# Patient Record
Sex: FEMALE | HISPANIC OR LATINO | Employment: FULL TIME | URBAN - METROPOLITAN AREA
[De-identification: names, ages, dates, MRNs, and addresses within clinical notes are randomized per-mention and may not be internally consistent; named-entity substitution may affect disease eponyms.]

---

## 2021-12-23 ENCOUNTER — HOSPITAL ENCOUNTER (EMERGENCY)
Facility: CLINIC | Age: 41
Discharge: HOME | End: 2021-12-23
Attending: EMERGENCY MEDICINE | Admitting: EMERGENCY MEDICINE
Payer: MEDICAID

## 2021-12-23 ENCOUNTER — APPOINTMENT (OUTPATIENT)
Dept: ULTRASOUND IMAGING | Facility: CLINIC | Age: 41
End: 2021-12-23
Attending: EMERGENCY MEDICINE
Payer: MEDICAID

## 2021-12-23 VITALS
SYSTOLIC BLOOD PRESSURE: 121 MMHG | DIASTOLIC BLOOD PRESSURE: 76 MMHG | OXYGEN SATURATION: 98 % | HEART RATE: 80 BPM | RESPIRATION RATE: 16 BRPM

## 2021-12-23 DIAGNOSIS — Z3A.10 10 WEEKS GESTATION OF PREGNANCY: ICD-10-CM

## 2021-12-23 LAB
ALBUMIN UR-MCNC: 10 MG/DL
APPEARANCE UR: CLEAR
BACTERIA #/AREA URNS HPF: ABNORMAL /HPF
BILIRUB UR QL STRIP: NEGATIVE
COLOR UR AUTO: ABNORMAL
GLUCOSE UR STRIP-MCNC: NEGATIVE MG/DL
HCG UR QL: POSITIVE
HGB UR QL STRIP: NEGATIVE
KETONES UR STRIP-MCNC: NEGATIVE MG/DL
LEUKOCYTE ESTERASE UR QL STRIP: NEGATIVE
MUCOUS THREADS #/AREA URNS LPF: PRESENT /LPF
NITRATE UR QL: NEGATIVE
PH UR STRIP: 6.5 [PH] (ref 5–7)
RBC URINE: 1 /HPF
SP GR UR STRIP: 1.02 (ref 1–1.03)
SQUAMOUS EPITHELIAL: 11 /HPF
UROBILINOGEN UR STRIP-MCNC: NORMAL MG/DL
WBC URINE: 1 /HPF

## 2021-12-23 PROCEDURE — 76815 OB US LIMITED FETUS(S): CPT

## 2021-12-23 PROCEDURE — 76801 OB US < 14 WKS SINGLE FETUS: CPT

## 2021-12-23 PROCEDURE — 81025 URINE PREGNANCY TEST: CPT | Performed by: EMERGENCY MEDICINE

## 2021-12-23 PROCEDURE — 81001 URINALYSIS AUTO W/SCOPE: CPT | Performed by: EMERGENCY MEDICINE

## 2021-12-23 PROCEDURE — 99285 EMERGENCY DEPT VISIT HI MDM: CPT | Mod: 25

## 2021-12-23 ASSESSMENT — ENCOUNTER SYMPTOMS: ABDOMINAL PAIN: 0

## 2021-12-23 NOTE — ED TRIAGE NOTES
Patient reports she is 2 months late for her menstrual cycle. She has not taken a home pregnancy test. She was told previously that she is unable to sustain any further pregnancies. Patient Thai speaking only.

## 2021-12-23 NOTE — ED PROVIDER NOTES
History   Chief Complaint:  Pregnancy Test     The history is provided by the patient. A  was used (South African).      Anh Roberts is a 41 year old female who presents with possible pregnancy. The patient states she is 2 months late for her menstrual cycle. Her last menstrual cycle was in October. She notes at [facility] 2 years ago, she was told she had a uterine fibroid and would not be able to have another pregnancy in the future. She denies any abdominal pain or vaginal bleeding. The patient states she has not taken any home pregnancy tests.     Review of Systems   Gastrointestinal: Negative for abdominal pain.   Genitourinary: Negative for vaginal bleeding.   All other systems reviewed and are negative.    Allergies:  Aspirin      Medications:  Singulair   Flonase     Past Medical History:     Uterine fibroid     Family History:  Mother - Diabetes Mellitus       Social History:  The patient was unaccompanied to the emergency department.    Physical Exam     Patient Vitals for the past 24 hrs:   BP Pulse Resp SpO2   12/23/21 0900 121/76 80 16 98 %     Physical Exam  General: Resting comfortably on the gurney  Head:  The scalp, face, and head appear normal  Eyes:  The pupils are equal, round, and reactive to light    There is no nystagmus    Extraocular muscles are intact    Conjunctivae and sclerae are normal  ENT:    The nose is normal    Pinnae are normal    The oropharynx is normal    Uvula is in the midline  Neck:  Normal range of motion    There is no rigidity noted    There is no midline cervical spine pain/tenderness    Trachea is in the midline    No mass is detected  CV:  Regular rate and underlying rhythm     Normal S1/S2, no S3/S4    No pathological murmur detected  Resp:  Lungs are clear    There is no tachypnea    Non-labored    No rales    No wheezing   GI:  There is a large, firm, suprapubic mass likely consistent with known fibroid and 1st trimester pregnancy.      No tympani    No rebound tenderness     Non-surgical without peritoneal features  MS:  Normal muscular tone    Symmetric motor strength    No major joint effusions    No asymmetric leg swelling, no calf tenderness  Skin:  No rash or acute skin lesions noted  Neuro:  Speech is normal and fluent  Psych: Awake. Alert.      Normal affect.  Appropriate interactions.  Lymph: No anterior cervical lymphadenopathy noted    Emergency Department Course   Imaging:  US OB < 14 Weeks Single   Preliminary Result   IMPRESSION:    1.  Single living intrauterine gestation at 10 weeks 2 days, EDC   7/19/2022.   2.  Large uterine fibroid in the uterine body on the right measures   14.2 cm.   3.  Nonvisualization of the right ovary.       POC US OB TRANSABDOMINAL LIMITED   Final Result   PROCEDURE: Limited Point of Care Bedside OB Ultrasound   PERFORMED BY: Dr. Clint Mejia   INDICATIONS/SYMPTOM:  Pelvic Pain, Low abdominal pain   PROBE: Low Frequency Convex probe, transabdominal    BODY LOCATION: The ultrasound was performed using a low frequency probe, transabdominal technique.  Longitudinal and transverse views.   FINDINGS:    1.  Gestational sac seen: Yes   2.  Intrauterine pregnancy seen: Yes   3.  Fetal cardiac activity: Yes, 160s   4.  Fetal movement seen: Yes   5.  Free fluid in the pelvis: No   Other findings: Very large uterine fibroid noted   INTERPRETATION: The bedside US exam reveals viable  intrauterine pregnancy at this time.  There is a large fibroid noted.  A formal ultrasound is also ordered   IMAGE DOCUMENTATION: Images were archived to the US hard drive, and archived to PACS system.      Report per radiology   POC US read by me.     Laboratory:  Labs Ordered and Resulted from Time of ED Arrival to Time of ED Departure   HCG QUALITATIVE URINE - Abnormal       Result Value    hCG Urine Qualitative Positive (*)    ROUTINE UA WITH MICROSCOPIC REFLEX TO CULTURE - Abnormal    Color Urine Light Yellow      Appearance  Urine Clear      Glucose Urine Negative      Bilirubin Urine Negative      Ketones Urine Negative      Specific Gravity Urine 1.024      Blood Urine Negative      pH Urine 6.5      Protein Albumin Urine 10  (*)     Urobilinogen Urine Normal      Nitrite Urine Negative      Leukocyte Esterase Urine Negative      Bacteria Urine Few (*)     Mucus Urine Present (*)     RBC Urine 1      WBC Urine 1      Squamous Epithelials Urine 11 (*)      Emergency Department Course:  Reviewed:  I reviewed nursing notes and vitals    Assessments:  0856 I obtained history and examined the patient as noted above.   0953 I performed a bedside ultrasound, findings above.   1056 I rechecked the patient and explained findings. The patient states she has two teenage children. This is an unexpected pregnancy.     Disposition:  The patient was discharged to home.     Impression & Plan   Medical Decision Making:  This patient presents with a known history of uterine fibroid who is under the believe, as she was apparently told by some healthcare provider, that she was unable to get pregnant given the size of the fibroid.  She comes in having missed 2 periods.  Patient's urine pregnancy test is positive.  Bedside ultrasound confirms intrauterine pregnancy in the first trimester.  A large uterine fibroid was noted on the right.  So as to establish a baseline formal ultrasound the patient was sent for formal ultrasonography which showed a very large fibroid in the uterus as noted above with a viable 10-week +2-day intrauterine pregnancy.  Patient was given this information and she is referred to OB/GYN for consultation follow-up and management.  No life-threatening etiologies are noted at this time.  There is no evidence of ectopic pregnancy or or ovarian torsion.    Diagnosis:    ICD-10-CM    1. 10 weeks gestation of pregnancy  Z3A.10      Scribe Disclosure:  Janet POWELL, am serving as a scribe at 8:56 AM on 12/23/2021 to document services  personally performed by Clint Mejia MD based on my observations and the provider's statements to me.     Clint Mejia MD  12/23/21 1155

## 2022-01-05 ENCOUNTER — APPOINTMENT (OUTPATIENT)
Dept: ULTRASOUND IMAGING | Facility: CLINIC | Age: 42
End: 2022-01-05
Attending: EMERGENCY MEDICINE
Payer: MEDICAID

## 2022-01-05 ENCOUNTER — HOSPITAL ENCOUNTER (EMERGENCY)
Facility: CLINIC | Age: 42
Discharge: HOME | End: 2022-01-05
Attending: EMERGENCY MEDICINE | Admitting: EMERGENCY MEDICINE
Payer: MEDICAID

## 2022-01-05 VITALS
HEART RATE: 83 BPM | SYSTOLIC BLOOD PRESSURE: 106 MMHG | DIASTOLIC BLOOD PRESSURE: 71 MMHG | TEMPERATURE: 98.3 F | OXYGEN SATURATION: 100 % | RESPIRATION RATE: 18 BRPM

## 2022-01-05 DIAGNOSIS — O20.0 THREATENED MISCARRIAGE: Primary | ICD-10-CM

## 2022-01-05 DIAGNOSIS — O46.90 VAGINAL BLEEDING DURING PREGNANCY: ICD-10-CM

## 2022-01-05 LAB
ABO/RH(D): NORMAL
ANION GAP SERPL CALCULATED.3IONS-SCNC: 6 MMOL/L (ref 3–14)
ANTIBODY SCREEN: NEGATIVE
B-HCG SERPL-ACNC: ABNORMAL IU/L (ref 0–5)
BASOPHILS # BLD AUTO: 0 10E3/UL (ref 0–0.2)
BASOPHILS NFR BLD AUTO: 0 %
BUN SERPL-MCNC: 9 MG/DL (ref 7–30)
CALCIUM SERPL-MCNC: 8.4 MG/DL (ref 8.5–10.1)
CHLORIDE BLD-SCNC: 109 MMOL/L (ref 94–109)
CO2 SERPL-SCNC: 22 MMOL/L (ref 20–32)
CREAT SERPL-MCNC: 0.46 MG/DL (ref 0.52–1.04)
EOSINOPHIL # BLD AUTO: 0.1 10E3/UL (ref 0–0.7)
EOSINOPHIL NFR BLD AUTO: 1 %
ERYTHROCYTE [DISTWIDTH] IN BLOOD BY AUTOMATED COUNT: 19.4 % (ref 10–15)
GFR SERPL CREATININE-BSD FRML MDRD: >90 ML/MIN/1.73M2
GLUCOSE BLD-MCNC: 90 MG/DL (ref 70–99)
HCT VFR BLD AUTO: 31.9 % (ref 35–47)
HGB BLD-MCNC: 9.3 G/DL (ref 11.7–15.7)
IMM GRANULOCYTES # BLD: 0 10E3/UL
IMM GRANULOCYTES NFR BLD: 0 %
LYMPHOCYTES # BLD AUTO: 1.5 10E3/UL (ref 0.8–5.3)
LYMPHOCYTES NFR BLD AUTO: 18 %
MCH RBC QN AUTO: 22.9 PG (ref 26.5–33)
MCHC RBC AUTO-ENTMCNC: 29.2 G/DL (ref 31.5–36.5)
MCV RBC AUTO: 79 FL (ref 78–100)
MONOCYTES # BLD AUTO: 0.7 10E3/UL (ref 0–1.3)
MONOCYTES NFR BLD AUTO: 8 %
NEUTROPHILS # BLD AUTO: 6.3 10E3/UL (ref 1.6–8.3)
NEUTROPHILS NFR BLD AUTO: 73 %
NRBC # BLD AUTO: 0 10E3/UL
NRBC BLD AUTO-RTO: 0 /100
PLATELET # BLD AUTO: 389 10E3/UL (ref 150–450)
POTASSIUM BLD-SCNC: 3.6 MMOL/L (ref 3.4–5.3)
RBC # BLD AUTO: 4.06 10E6/UL (ref 3.8–5.2)
SODIUM SERPL-SCNC: 137 MMOL/L (ref 133–144)
SPECIMEN EXPIRATION DATE: NORMAL
WBC # BLD AUTO: 8.5 10E3/UL (ref 4–11)

## 2022-01-05 PROCEDURE — 36415 COLL VENOUS BLD VENIPUNCTURE: CPT | Performed by: EMERGENCY MEDICINE

## 2022-01-05 PROCEDURE — 85014 HEMATOCRIT: CPT | Performed by: EMERGENCY MEDICINE

## 2022-01-05 PROCEDURE — 76801 OB US < 14 WKS SINGLE FETUS: CPT

## 2022-01-05 PROCEDURE — 82310 ASSAY OF CALCIUM: CPT | Performed by: EMERGENCY MEDICINE

## 2022-01-05 PROCEDURE — 99284 EMERGENCY DEPT VISIT MOD MDM: CPT | Mod: 25

## 2022-01-05 PROCEDURE — 86850 RBC ANTIBODY SCREEN: CPT | Performed by: EMERGENCY MEDICINE

## 2022-01-05 PROCEDURE — 86901 BLOOD TYPING SEROLOGIC RH(D): CPT | Performed by: EMERGENCY MEDICINE

## 2022-01-05 PROCEDURE — 84702 CHORIONIC GONADOTROPIN TEST: CPT | Performed by: EMERGENCY MEDICINE

## 2022-01-05 ASSESSMENT — ENCOUNTER SYMPTOMS
FEVER: 0
ABDOMINAL PAIN: 0
SHORTNESS OF BREATH: 0

## 2022-01-05 NOTE — ED PROVIDER NOTES
History   Chief Complaint:  Vaginal Bleeding     The history is provided by the patient.      Anh Roberts is a 41 year old female, , who presents with vaginal bleeding. Her first two pregnancies were normal without complication. Approximately four hours prior to her ED visit, the patient began noticing moderately heavy vaginal bleeding. She did not notice any large clots. The patient is concerned for possible miscarriage. She does not have an OB/Gyn established as she only found out two weeks ago that she is pregnant. She denies any history of anemia and is not on blood thinners. She denies abdominal pain, fever, chest pain, and shortness of breath.     Review of Systems   Constitutional: Negative for fever.   Respiratory: Negative for shortness of breath.    Cardiovascular: Negative for chest pain.   Gastrointestinal: Negative for abdominal pain.   Genitourinary: Positive for vaginal bleeding.   All other systems reviewed and are negative.    Allergies:  Aspirin    Medications:  The patient is currently on no regular medications.    Past Medical History:     The patient has no known medical history.     Past Surgical History:    The patient has no known surgical history.    Family History:    The patient has no known family history.    Social History:  The patient presents to the ED with her spouse. She has two children.    Physical Exam     Patient Vitals for the past 24 hrs:   BP Temp Temp src Pulse Resp SpO2   22 1720 105/74 -- -- 83 -- 100 %   22 1333 117/75 98.3  F (36.8  C) Oral 102 18 100 %     Physical Exam  General: Alert, appears well-developed and well-nourished. Cooperative.     In mild distress, Polish speaking  HEENT:  Head:  Atraumatic  Ears:  External ears are normal  Mouth/Throat:  Oropharynx is without erythema or exudate and mucous membranes are moist.   Eyes:   Conjunctivae normal and EOM are normal. No scleral icterus.  CV:  Normal rate, regular rhythm, normal heart  sounds and radial pulses are 2+ and symmetric.  No murmur.  Resp:  Breath sounds are clear bilaterally    Non-labored, no retractions or accessory muscle use  GI:  Abdomen is soft, no distension, no tenderness. No rebound or guarding.  No CVA tenderness bilaterally  Pelvic: Exam performed in presence of female RN.  Vaginal bleeding.  No clot at external cervical os.    MS:  Normal range of motion. No edema.    Normal strength in all 4 extremities.     Back atraumatic.    No midline cervical, thoracic, or lumbar tenderness  Skin:  Warm and dry.  No rash or lesions noted.  Neuro: Alert. Normal strength.  GCS: 15  Psych:  Normal mood and affect.    Emergency Department Course   Imaging:  US OB <14 Weeks Single  1.  Single living intrauterine gestation at 12 weeks 2 days, EDC  7/18/2022.  2.  Large uterine fibroid measures up to 13.1 cm.  Report per radiology    Laboratory:  CBC: WBC 8.5, HGB 9.3 (L),      BMP: creatinine 0.46 (L), calcium 8.4 (L) o/w WNL     HCG qualitative blood: 186069 (H)    ABO RH Type and Screen: O+, antibody negative      Emergency Department Course:  Reviewed:  I reviewed nursing notes, vitals, past medical history, Care Everywhere and [facility]    Assessments:  1710 I obtained history and examined the patient as noted above.   1940 I spoke with the patient via phone, with the use of , as I was unable to visit with her prior to when she left the ED.     Disposition:  The patient was discharged to home.     Impression & Plan     CMS Diagnoses: None    Medical Decision Making:  Anh Roberts is a 41 year old female who present to the ED here today for evaluation of vaginal bleeding in pregnancy.  Please refer to the [facility] for further details.  A large differential diagnosis was considered including but not limited to threatened miscarriage, active miscarriage, subchorionic hemorrhage, ectopic pregnancy, placenta previa, placental abruption, adenomyosis, leiomyoma, heterotopic  pregnancy, malignancy, iatrogenic, amongst others.  Upon my exam, the patient had no significant abdominal tenderness palpation.She was vitally stable.  Basic laboratory analysis was completed.  HCG was found to be 102,654. The patient is Rh+, RhoGam was not given.  Ultrasound imaging was completed.  Imaging results returning indicating single living  intrauterine gestation at 12 weeks 2 days and large uterine fibroid measures up to 13.1 cm.  With these results, I felt patient patient was stable.  Benign abdominal exam here today. I doubt more serious abdominal pathology such as appendicitis appendicitis, cholecystitis, volvulus, volvulus, intra-abdominal abscess, diverticulitis, amongst others.  I advised her to call her OB office today, and have follow-up within the next 2-3 days for recheck and repeat hCG.  Patient will return to the ED for any changing or worsening symptoms, worsening abdominal cramping, bleeding increased (he bleeding through more than 1 pad an hour for greater than 6 hours), fevers >101F, new concerns.  Likelihood of threatened miscarriage was discussed in great detail with the patient.  All questions were answered.  She was in agreement for discharge and the treatment plan as stated above.    Due to language barrier, an  was present during the history-taking and subsequent discussion (and for part of the physical exam) with this patient.    Diagnosis:    ICD-10-CM    1. Threatened miscarriage  O20.0    2. Vaginal bleeding during pregnancy  O46.90        Scribe Disclosure:  I, Anisa Valdez, am serving as a scribe at 5:17 PM on 1/5/2022 to document services personally performed by Paul Cooley MD based on my observations and the provider's statements to me.              Paul Cooley MD  01/05/22 5039

## 2022-01-05 NOTE — ED TRIAGE NOTES
Pt presents for evaluation of vaginal bleeding. Pt is currently about 3 months pregnant. Bleeding started 15 minutes PTA. Blood is bright blood. Denies any clots or tissue. Denies pain with bleeding. This is 3rd pregnancy. No hx of miscarriage. Has had an US since positive pregnancy test. JENNIFER 7/19/22. LMP was 10/12/21.

## 2022-01-07 ENCOUNTER — APPOINTMENT (OUTPATIENT)
Dept: INTERPRETER SERVICES | Facility: CLINIC | Age: 42
End: 2022-01-07
Payer: MEDICAID

## 2022-01-10 ENCOUNTER — PRENATAL OFFICE VISIT (OUTPATIENT)
Dept: NURSING | Facility: CLINIC | Age: 42
End: 2022-01-10

## 2022-01-10 VITALS — WEIGHT: 125 LBS | BODY MASS INDEX: 24.54 KG/M2 | HEIGHT: 60 IN

## 2022-01-10 DIAGNOSIS — O09.90 SUPERVISION OF HIGH-RISK PREGNANCY: Primary | ICD-10-CM

## 2022-01-10 PROCEDURE — 99207 PR NO CHARGE NURSE ONLY: CPT

## 2022-01-11 ENCOUNTER — OFFICE VISIT (OUTPATIENT)
Dept: OBGYN | Facility: CLINIC | Age: 42
End: 2022-01-11
Payer: MEDICAID

## 2022-01-11 ENCOUNTER — PATIENT OUTREACH (OUTPATIENT)
Dept: NURSING | Facility: CLINIC | Age: 42
End: 2022-01-11

## 2022-01-11 VITALS — BODY MASS INDEX: 25.78 KG/M2 | WEIGHT: 132 LBS | SYSTOLIC BLOOD PRESSURE: 102 MMHG | DIASTOLIC BLOOD PRESSURE: 62 MMHG

## 2022-01-11 DIAGNOSIS — O09.90 SUPERVISION OF HIGH-RISK PREGNANCY: ICD-10-CM

## 2022-01-11 DIAGNOSIS — D50.0 IRON DEFICIENCY ANEMIA DUE TO CHRONIC BLOOD LOSS: ICD-10-CM

## 2022-01-11 DIAGNOSIS — D25.1 INTRAMURAL LEIOMYOMA OF UTERUS: Primary | ICD-10-CM

## 2022-01-11 DIAGNOSIS — O09.522 MULTIGRAVIDA OF ADVANCED MATERNAL AGE IN SECOND TRIMESTER: ICD-10-CM

## 2022-01-11 LAB
ALBUMIN UR-MCNC: NEGATIVE MG/DL
APPEARANCE UR: CLEAR
BILIRUB UR QL STRIP: NEGATIVE
COLOR UR AUTO: YELLOW
ERYTHROCYTE [DISTWIDTH] IN BLOOD BY AUTOMATED COUNT: 18.3 % (ref 10–15)
GLUCOSE UR STRIP-MCNC: NEGATIVE MG/DL
HCT VFR BLD AUTO: 28.7 % (ref 35–47)
HGB BLD-MCNC: 8.8 G/DL (ref 11.7–15.7)
HGB UR QL STRIP: NEGATIVE
KETONES UR STRIP-MCNC: ABNORMAL MG/DL
LEUKOCYTE ESTERASE UR QL STRIP: NEGATIVE
MCH RBC QN AUTO: 23 PG (ref 26.5–33)
MCHC RBC AUTO-ENTMCNC: 30.7 G/DL (ref 31.5–36.5)
MCV RBC AUTO: 75 FL (ref 78–100)
NITRATE UR QL: NEGATIVE
PH UR STRIP: 7 [PH] (ref 5–7)
PLATELET # BLD AUTO: 415 10E3/UL (ref 150–450)
RBC # BLD AUTO: 3.83 10E6/UL (ref 3.8–5.2)
SP GR UR STRIP: 1.02 (ref 1–1.03)
UROBILINOGEN UR STRIP-ACNC: 0.2 E.U./DL
WBC # BLD AUTO: 9.1 10E3/UL (ref 4–11)

## 2022-01-11 PROCEDURE — 81003 URINALYSIS AUTO W/O SCOPE: CPT | Performed by: OBSTETRICS & GYNECOLOGY

## 2022-01-11 PROCEDURE — 87340 HEPATITIS B SURFACE AG IA: CPT | Performed by: OBSTETRICS & GYNECOLOGY

## 2022-01-11 PROCEDURE — 87086 URINE CULTURE/COLONY COUNT: CPT | Performed by: OBSTETRICS & GYNECOLOGY

## 2022-01-11 PROCEDURE — 86762 RUBELLA ANTIBODY: CPT | Performed by: OBSTETRICS & GYNECOLOGY

## 2022-01-11 PROCEDURE — 87186 SC STD MICRODIL/AGAR DIL: CPT | Performed by: OBSTETRICS & GYNECOLOGY

## 2022-01-11 PROCEDURE — 86803 HEPATITIS C AB TEST: CPT | Performed by: OBSTETRICS & GYNECOLOGY

## 2022-01-11 PROCEDURE — 80053 COMPREHEN METABOLIC PANEL: CPT | Performed by: OBSTETRICS & GYNECOLOGY

## 2022-01-11 PROCEDURE — 99203 OFFICE O/P NEW LOW 30 MIN: CPT | Performed by: OBSTETRICS & GYNECOLOGY

## 2022-01-11 PROCEDURE — 87389 HIV-1 AG W/HIV-1&-2 AB AG IA: CPT | Performed by: OBSTETRICS & GYNECOLOGY

## 2022-01-11 PROCEDURE — 85027 COMPLETE CBC AUTOMATED: CPT | Performed by: OBSTETRICS & GYNECOLOGY

## 2022-01-11 PROCEDURE — 86780 TREPONEMA PALLIDUM: CPT | Performed by: OBSTETRICS & GYNECOLOGY

## 2022-01-11 PROCEDURE — 36415 COLL VENOUS BLD VENIPUNCTURE: CPT | Performed by: OBSTETRICS & GYNECOLOGY

## 2022-01-11 PROCEDURE — 87088 URINE BACTERIA CULTURE: CPT | Performed by: OBSTETRICS & GYNECOLOGY

## 2022-01-11 RX ORDER — FERROUS SULFATE 325(65) MG
325 TABLET ORAL
Qty: 90 TABLET | Refills: 3 | Status: SHIPPED | OUTPATIENT
Start: 2022-01-11

## 2022-01-11 RX ORDER — FERROUS SULFATE 325(65) MG
325 TABLET ORAL
Qty: 90 TABLET | Refills: 3 | Status: SHIPPED | OUTPATIENT
Start: 2022-01-11 | End: 2022-02-16

## 2022-01-11 SDOH — ECONOMIC STABILITY: FOOD INSECURITY: WITHIN THE PAST 12 MONTHS, THE FOOD YOU BOUGHT JUST DIDN'T LAST AND YOU DIDN'T HAVE MONEY TO GET MORE.: NEVER TRUE

## 2022-01-11 SDOH — ECONOMIC STABILITY: TRANSPORTATION INSECURITY
IN THE PAST 12 MONTHS, HAS LACK OF TRANSPORTATION KEPT YOU FROM MEETINGS, WORK, OR FROM GETTING THINGS NEEDED FOR DAILY LIVING?: NO

## 2022-01-11 SDOH — ECONOMIC STABILITY: INCOME INSECURITY: IN THE LAST 12 MONTHS, WAS THERE A TIME WHEN YOU WERE NOT ABLE TO PAY THE MORTGAGE OR RENT ON TIME?: NO

## 2022-01-11 SDOH — ECONOMIC STABILITY: FOOD INSECURITY: WITHIN THE PAST 12 MONTHS, YOU WORRIED THAT YOUR FOOD WOULD RUN OUT BEFORE YOU GOT MONEY TO BUY MORE.: NEVER TRUE

## 2022-01-11 SDOH — ECONOMIC STABILITY: TRANSPORTATION INSECURITY
IN THE PAST 12 MONTHS, HAS THE LACK OF TRANSPORTATION KEPT YOU FROM MEDICAL APPOINTMENTS OR FROM GETTING MEDICATIONS?: NO

## 2022-01-11 ASSESSMENT — SOCIAL DETERMINANTS OF HEALTH (SDOH)
WITHIN THE LAST YEAR, HAVE TO BEEN RAPED OR FORCED TO HAVE ANY KIND OF SEXUAL ACTIVITY BY YOUR PARTNER OR EX-PARTNER?: NO
HOW HARD IS IT FOR YOU TO PAY FOR THE VERY BASICS LIKE FOOD, HOUSING, MEDICAL CARE, AND HEATING?: NOT HARD AT ALL
HOW OFTEN DO YOU GET TOGETHER WITH FRIENDS OR RELATIVES?: MORE THAN THREE TIMES A WEEK
WITHIN THE LAST YEAR, HAVE YOU BEEN KICKED, HIT, SLAPPED, OR OTHERWISE PHYSICALLY HURT BY YOUR PARTNER OR EX-PARTNER?: NO
WITHIN THE LAST YEAR, HAVE YOU BEEN HUMILIATED OR EMOTIONALLY ABUSED IN OTHER WAYS BY YOUR PARTNER OR EX-PARTNER?: NO
WITHIN THE LAST YEAR, HAVE YOU BEEN AFRAID OF YOUR PARTNER OR EX-PARTNER?: NO

## 2022-01-11 ASSESSMENT — ACTIVITIES OF DAILY LIVING (ADL): DEPENDENT_IADLS:: INDEPENDENT

## 2022-01-11 NOTE — PROGRESS NOTES
SUBJECTIVE:     HPI:    This is a 41 year old female patient,  who presents for her first obstetrical visit.    JENNIFER: 2022, by Last Menstrual Period.  She is 13w0d weeks.  Her cycles are regular.  Her last menstrual period was normal.   Since her LMP, she has experienced  vaginal bleeding and cramping).   She denies nausea, emesis, abdominal pain, fatigue, headache, loss of appetite, vaginal discharge, dysuria, pelvic pain, urinary urgency, lightheadedness, urinary frequency, vaginal bleeding, hemorrhoids and constipation.    Additional History: Large right-sided uterine fibroid measures 13.1 x 11.0 x 10.9  Cm noted on &22   No insurance-care coordination referral placed.    Have you travelled during the pregnancy?No  Have your sexual partner(s) travelled during the pregnancy?No      HISTORY:   Planned Pregnancy: Yes  Marital Status:   Occupation: [employer]  Living in Household: Spouse and Children    Past History:  Her past medical history   History reviewed. No pertinent past medical history..      She has a history of  ,term 2003, ,term 2007    Since her last LMP she denies use of alcohol, tobacco and street drugs.    Past medical, surgical, social and family history were reviewed and updated in EPIC.          Current Outpatient Medications   Medication     Prenatal Vit-Fe Fumarate-FA (PRENATAL VITAMIN PO)     No current facility-administered medications for this visit.       ROS:   12 point review of systems negative other than symptoms noted below or in the HPI.  Genitourinary: Cramps      OBJECTIVE:     EXAM:  Ht 1.524 m (5')   Wt 56.7 kg (125 lb)   LMP 10/12/2021 (Exact Date)   BMI 24.41 kg/m   Body mass index is 24.41 kg/m .    Nurse phone visit completed. Prenatal book and folder (containing standard educational hand-outs and brochures)  will be given at the next visit to patient. Information in folder reviewed over the phone. Questions answered. Brochure given on  optional screening available to assess chromosomal anomalies. Pt desires NIPS. Pt advised to call the clinic if she has any questions or concerns related to her pregnancy. Prenatal labs future ordered. New prenatal visit scheduled on 1/11/2022 with .      No results found for: PAP  PHQ-9 score:  No flowsheet data found.  Unable to obtain PHQ/BOB.  used.      No flowsheet data found.          Patient supplied answers from flow sheet for:  Prenatal OB Questionnaire.  Past Medical History  Have you ever recieved care for your mental health? : No  Have you ever been in a major accident or suffered serious trauma?: No  Within the last year, has anyone hit, slapped, kicked or otherwise hurt you?: No  In the last year, has anyone forced you to have sex when you didn't want to?: No    Past Medical History 2   Have you ever received a blood transfusion?: No  Would you accept a blood transfusion if was medically recommended?: Yes  Does anyone in your home smoke?: No   Is your blood type Rh negative?: Unknown  Have you ever ?: (!) Yes  Have you been hospitalized for a nonsurgical reason excluding normal delivery?: No  Have you ever had an abnormal pap smear?: No    Past Medical History (Continued)  Do you have a history of abnormalities of the uterus?: No  Did your mother take ALEJA or any other hormones when she was pregnant with you?: Unknown  Do you have any other problems we have not asked about which you feel may be important to this pregnancy?: No      A  was used (Stateless) for Prenatal phone visit    Giselle Mcmahon RN

## 2022-01-11 NOTE — PROGRESS NOTES
SUBJECTIVE:      HPI:     This is a 41 year old female patient,  who presents for her first obstetrical visit.     JENNIFER: 2022, by Last Menstrual Period.  She is 13w0d weeks.  Her cycles are regular.  Her last menstrual period was normal.   Since her LMP, she has experienced  vaginal bleeding and cramping).     She denies nausea, emesis, abdominal pain, fatigue, headache, loss of appetite, vaginal discharge, dysuria, pelvic pain, urinary urgency, lightheadedness, urinary frequency, vaginal bleeding, hemorrhoids and constipation.     Additional History: Large right-sided uterine fibroid measures 13.1 x 11.0 x 10.9  Cm noted on &22   No insurance-care coordination referral placed.     Have you travelled during the pregnancy?No  Have your sexual partner(s) travelled during the pregnancy?No        HISTORY:   Planned Pregnancy: Yes  Marital Status:   Occupation: [employer]  Living in Household: Spouse and Children     Past History:  Her past medical history   Past Medical History   History reviewed. No pertinent past medical history.   .       She has a history of  ,term 2003, ,term 2007     Since her last LMP she denies use of alcohol, tobacco and street drugs.     Past medical, surgical, social and family history were reviewed and updated in EPIC.             Current Outpatient Medications   Medication     Prenatal Vit-Fe Fumarate-FA (PRENATAL VITAMIN PO)      No current facility-administered medications for this visit.         ROS:   12 point review of systems negative other than symptoms noted below or in the HPI.  Genitourinary: Cramps        OBJECTIVE:      EXAM:  Ht 1.524 m (5')   Wt 56.7 kg (125 lb)   LMP 10/12/2021 (Exact Date)   BMI 24.41 kg/m   Body mass index is 24.41 kg/m .     Nurse phone visit completed. Prenatal book and folder (containing standard educational hand-outs and brochures)  will be given at the next visit to patient. Information in folder reviewed over the  phone. Questions answered. Brochure given on optional screening available to assess chromosomal anomalies. Pt desires NIPS. Pt advised to call the clinic if she has any questions or concerns related to her pregnancy. Prenatal labs future ordered. New prenatal visit scheduled on 2022 with .     No results found for: PAP  PHQ-9 score:  No flowsheet data found.  Unable to obtain PHQ/BOB.  used.        No flowsheet data found.      Patient supplied answers from flow sheet for:  Prenatal OB Questionnaire.  Past Medical History  Have you ever recieved care for your mental health? : No  Have you ever been in a major accident or suffered serious trauma?: No  Within the last year, has anyone hit, slapped, kicked or otherwise hurt you?: No  In the last year, has anyone forced you to have sex when you didn't want to?: No     Past Medical History 2   Have you ever received a blood transfusion?: No  Would you accept a blood transfusion if was medically recommended?: Yes  Does anyone in your home smoke?: No   Is your blood type Rh negative?: Unknown  Have you ever ?: (!) Yes  Have you been hospitalized for a nonsurgical reason excluding normal delivery?: No  Have you ever had an abnormal pap smear?: No     Past Medical History (Continued)  Do you have a history of abnormalities of the uterus?: No  Did your mother take ALEJA or any other hormones when she was pregnant with you?: Unknown  Do you have any other problems we have not asked about which you feel may be important to this pregnancy?: No        A  was used (Pashto) for Prenatal phone visit         OBJECTIVE:     EXAM:  LMP 10/12/2021 (Exact Date)  There is no height or weight on file to calculate BMI.        ASSESSMENT/PLAN:       ICD-10-CM    1. Intramural leiomyoma of uterus  D25.1    2. Multigravida of advanced maternal age in second trimester  O09.522        41 year old , On 2022 patient is 13w0d  weeks of pregnancy with JENNIFER of 7/19/2022, by Last Menstrual Period      Counseling given:   - Follow up in 4-6 weeks for return OB visit.    She isn't having any pain at this time.  Discussed the fibroid and previous ultrasound.     PLAN/PATIENT INSTRUCTIONS:    1. AMA- will refer to [facility] for level 2 ultrasound  2. Large fibroid, history of 2 vaginal deliveries.  Will watch growth and hgb during pregnancy.  (7/2020 she had an ultrasound and the fibroid was 9.8 cm at that time.  Now the fibroid is 13 cm in the largest dimension)  3. ASA 81 mg daily would ordinarily be recommended due to her increased risk of hypertensive disease.  However, she has anaphylaxis when she has taken ASA.  Won't prescribe this for her.  4. History of anemia due to bleeding, noted on chart review. Discussed with the fibroid she is also at higher risk of bleeding with delivery, or even needing surgery or a blood transfusion.  5. Flu shot today  6. covid vaccine- discussed we would recommend the vaccine. Discussed the risks of not getting the vaccine.  Discussed if she gets covid in pregnancy, and isn't vaccinated can have increased risk of severe disease, or loss of pregnancy.   7. Discussed the fibroid, pain potentially, increased size, growth of fetus and PTD.    8. Discussed growth us every 4 weeks after her MFM evaluation.    9. Discussed age and genetic testing.  Discussed genetic testing.  Discussed what chromosomes are and how we test for that.  She will do the testing today.   10. Prenatal labs today as well     Kami Bernard MD

## 2022-01-11 NOTE — PATIENT INSTRUCTIONS
Due to history of anemia which can be worse in pregnancy.  Please take the iron supplements as directed.  They were sent to the pharmacy    [facility], the maternal fetal medicine specialists will call to schedule your ultrasound.    Return to clinic 4 weeks

## 2022-01-11 NOTE — LETTER
[facility]  [address] [place] [id number]  Phone: [phone]  Fax: [phone]    January 11, 2022        Anh Roberts  [address]  [facility] [address]          To whom it may concern:    RE: Anh Roberts    Patient was seen and treated today at our clinic.    Please contact me for questions or concerns.      Sincerely,        Kami Bernard MD

## 2022-01-11 NOTE — PROGRESS NOTES
Clinic Care Coordination Contact    Clinic Care Coordination Contact  OUTREACH    Referral Information:  Referral Source: Care Team         Chief Complaint   Patient presents with     Clinic Care Coordination - Initial        Universal Utilization:      Utilization    Hospital Admissions  0             ED Visits  2             No Show Count (past year)  0                Current as of: 1/11/2022  7:35 AM            Clinical Concerns:  CC LYNNE spoke with pt regarding medical insurance. Pt shared in previous pregnancies she had MA. CC LYNNE completed referral for FRW to assist with application process.     Pt shared that she is doing well overall with her pregnancy, no concerns.    Current Medical Concerns:  none    Current Behavioral Concerns: none    Education Provided to patient: CC role      Health Maintenance Reviewed:    Clinical Pathway: None    Medication Management:  Did not discuss    Functional Status:  Dependent ADLs:: Independent  Dependent IADLs:: Independent  Bed or wheelchair confined:: No  Mobility Status: Independent  Fallen 2 or more times in the past year?: No  Any fall with injury in the past year?: No    Living Situation:  Current living arrangement:: I live in a private home with family  Type of residence:: Apartment    Lifestyle & Psychosocial Needs:    Social Determinants of Health     Tobacco Use: Low Risk      Smoking Tobacco Use: Never Smoker     Smokeless Tobacco Use: Never Used   Alcohol Use: Not on file   Financial Resource Strain: Low Risk      Difficulty of Paying Living Expenses: Not hard at all   Food Insecurity: No Food Insecurity     Worried About Running Out of Food in the Last Year: Never true     Ran Out of Food in the Last Year: Never true   Transportation Needs: No Transportation Needs     Lack of Transportation (Medical): No     Lack of Transportation (Non-Medical): No   Physical Activity: Not on file   Stress: No Stress Concern Present     Feeling of Stress : Not at all   Social  Connections: Unknown     Frequency of Communication with Friends and Family: Not on file     Frequency of Social Gatherings with Friends and Family: More than three times a week     Attends Confucianism Services: Not on file     Active Member of Clubs or Organizations: Not on file     Attends Club or Organization Meetings: Not on file     Marital Status:    Intimate Partner Violence: Not At Risk     Fear of Current or Ex-Partner: No     Emotionally Abused: No     Physically Abused: No     Sexually Abused: No   Depression: Not on file   Housing Stability: Unknown     Unable to Pay for Housing in the Last Year: No     Number of Places Lived in the Last Year: Not on file     Unstable Housing in the Last Year: No              Confucianism or spiritual beliefs that impact treatment:: No  Mental health DX:: No  Mental health management concern (GOAL):: No  Chemical Dependency Status: No Current Concerns  Informal Support system:: Children,Spouse,Family          Resources and Interventions:  Current Resources:      Community Resources: None  Supplies used at home:: None  Equipment Currently Used at Home: none  Employment Status: homemaker         Advance Care Plan/Directive  Advanced Care Plans/Directives on file:: No    Referrals Placed: Financial Services     Goals:   Goals        General     1. Medical (pt-stated)      Notes - Note created  1/11/2022 11:19 AM by Arina Means LGSW     Goal Statement: I will apply for health insurance within the next 1-2 weeks if I am eligible.   Date Goal Set:  1/11/2022  Strengths:  Strong support system  Barriers: none  Date to Achieve By: 2/11/2022  Patient expressed understanding of goal: yes  Action steps to achieve this goal:  1. I understand a referral was placed to the Financial Resource Worker, I will receive a call within the next 3 business days.    2. I understand the financial worker will make two attempts to call me. If I still need help with this goal, I will connect  with my Care Coordination  Aparna at [phone].                 Patient/Caregiver understanding: Pt reports understanding and denies any additional questions or concerns at this times. SW CC engaged in AIDET communication during encounter.    Outreach Frequency: monthly  Future Appointments              Today Kami Felder MD; VIDEO,  [facility] WOM    In 1 month Kami Felder MD [facility] WOM        Plan: FRW will outreach to pt to assist with Medical Assistance application. CC SW will outreach in 1 month to discuss progress.    Aparna Means [facility]  Clinic Care Coordinator  [facility] [place]  [facility]  [phone]  [email or web address]

## 2022-01-11 NOTE — LETTER
[facility]  Patient Centered Plan of Care  About Me:        Patient Name:  Anh Roberts    YOB: 1980  Age:         41 year old   [place] MRN:    [id number] Telephone Information:  Home Phone [phone]   Mobile [phone]       Address:  [address]  [place] [address] Email address:  No e-mail address on record      Emergency Contact(s)    Name Relationship Lgl Grd Work Phone Home Phone Mobile Phone   1. VAMSHI RICHARDS E* Spouse   [phone]         My Access Plan  Medical Emergency 911   Primary Clinic Line [facility] - [phone]   24 Hour Appointment Line [phone] or  [phone]) (toll-free)   24 Hour Nurse Line [phone] (toll-free)   Preferred Urgent Care Other     Preferred [facility] [place]  [phone]     Preferred Pharmacy No Pharmacies Listed   Behavioral Health Crisis Line The National Suicide Prevention Lifeline at 1-529.775.4596 or 911       My Care Team Members  Patient Care Team       Relationship Specialty Notifications Start End    No Ref-Primary, Physician PCP - General   12/23/21     Fax: [phone]         Arina Means LGSW Lead Care Coordinator Primary Care - CC Admissions 1/11/22             My Care Plans  Self Management and Treatment Plan  Goals and (Comments)  Goals        General     1. Medical (pt-stated)      Notes - Note created  1/11/2022 11:19 AM by Arina Means LGSW     Goal Statement: I will apply for health insurance within the next 1-2 weeks if I am eligible.   Date Goal Set:  1/11/2022  Strengths:  Strong support system  Barriers: none  Date to Achieve By: 2/11/2022  Patient expressed understanding of goal: yes  Action steps to achieve this goal:  1. I understand a referral was placed to the Financial Resource Worker, I will receive a call within the next 3 business days.    2. I understand the financial worker will make two attempts  to call me. If I still need help with this goal, I will connect with my Care Coordination  Aparna at [phone].                    My Medical and Care Information  Problem List   There is no problem list on file for this patient.       Care Coordination Start Date: 1/11/2022   Frequency of Care Coordination: monthly     Form Last Updated: 01/11/2022

## 2022-01-11 NOTE — LETTER
[facility] CARE COORDINATION  [address] [place] [address]    January 11, 2022    Anh Roberts  [address]  [place] [address]      Dear Anh,    I am a clinic care coordinator who works with [facility]  [place]. I wanted to thank you for spending the time to talk with me.  Below is a description of clinic care coordination and how I can further assist you.      The clinic care coordination team is made up of a registered nurse,  and community health worker who understand the health care system. The goal of clinic care coordination is to help you manage your health and improve access to the health care system in the most efficient manner. The team can assist you in meeting your health care goals by providing education, coordinating services, strengthening the communication among your providers and supporting you with any resource needs.    Please feel free to contact me at [phone] with any questions or concerns. We are focused on providing you with the highest-quality healthcare experience possible and that all starts with you.     Sincerely,     AARON Sun    Enclosed: I have enclosed a copy of the Patient Centered Plan of Care. This has helpful information and goals that we have talked about. Please keep this in an easy to access place to use as needed.

## 2022-01-12 ENCOUNTER — APPOINTMENT (OUTPATIENT)
Dept: INTERPRETER SERVICES | Facility: CLINIC | Age: 42
End: 2022-01-12
Payer: MEDICAID

## 2022-01-12 ENCOUNTER — PATIENT OUTREACH (OUTPATIENT)
Dept: CARE COORDINATION | Facility: CLINIC | Age: 42
End: 2022-01-12
Payer: COMMERCIAL

## 2022-01-12 ENCOUNTER — TELEPHONE (OUTPATIENT)
Dept: OBGYN | Facility: CLINIC | Age: 42
End: 2022-01-12
Payer: COMMERCIAL

## 2022-01-12 LAB
ALBUMIN SERPL-MCNC: 3 G/DL (ref 3.4–5)
ALP SERPL-CCNC: 50 U/L (ref 40–150)
ALT SERPL W P-5'-P-CCNC: 16 U/L (ref 0–50)
ANION GAP SERPL CALCULATED.3IONS-SCNC: 9 MMOL/L (ref 3–14)
AST SERPL W P-5'-P-CCNC: 17 U/L (ref 0–45)
BILIRUB SERPL-MCNC: 0.2 MG/DL (ref 0.2–1.3)
BUN SERPL-MCNC: 8 MG/DL (ref 7–30)
CALCIUM SERPL-MCNC: 8.5 MG/DL (ref 8.5–10.1)
CHLORIDE BLD-SCNC: 108 MMOL/L (ref 94–109)
CO2 SERPL-SCNC: 18 MMOL/L (ref 20–32)
CREAT SERPL-MCNC: 0.42 MG/DL (ref 0.52–1.04)
GFR SERPL CREATININE-BSD FRML MDRD: >90 ML/MIN/1.73M2
GLUCOSE BLD-MCNC: 82 MG/DL (ref 70–99)
HBV SURFACE AG SERPL QL IA: NONREACTIVE
HCV AB SERPL QL IA: NONREACTIVE
HIV 1+2 AB+HIV1 P24 AG SERPL QL IA: NONREACTIVE
POTASSIUM BLD-SCNC: 3.8 MMOL/L (ref 3.4–5.3)
PROT SERPL-MCNC: 7.4 G/DL (ref 6.8–8.8)
RUBV IGG SERPL QL IA: 3.04 INDEX
RUBV IGG SERPL QL IA: POSITIVE
SODIUM SERPL-SCNC: 135 MMOL/L (ref 133–144)
T PALLIDUM AB SER QL: NONREACTIVE

## 2022-01-12 NOTE — PROGRESS NOTES
Clinic Care Coordination Contact  Program: [place].   County:  [facility] Case #:  [facility] Worker:   Cara #:   Subscriber #:   Renewal:  Date Applied:     [facility] Outreach:   2022: FRW called and completed papper application with patient over the phone and mailed it out to her to sing. FRW will follow up in 3 weeks   2022: FRW called and completed screening and patient stated that she only needed help with insurance due to she is pregnant. FRW set up a time to complete a paper application on 2022 at 1pm.   SNAP/CASH Application Screenin. Have you had Psychiatric hospital benefits before? No   2. How many people in the household, do you eat/buy food together? 4  3. What is your monthly income (include all tax members)? 2,500.00 hudband paid in cash.   4. Do you have a bank account?    5. Do you have any utility bills (electricity, rent, mortgage, phone, insurance, medical bills, etc.)? No   6. Do you have social security cards and/or green cards?No      Health Insurance Screening: CARA   1. Do you currently have health insurance, did you receive a renewal? My kids have Medical .   2. If you applied [facility], do you know your username/password?   3. How many people in the household? 4   4. Do you file taxes, who do you file with?  No   5. What is your monthly income (include all tax members)? 2,500  6. Do you have access to insurance through an employer (if yes, need EIN)? No   7. Do you have social security cards and/or green cards? No.       Health Insurance:      Referral/Screening:  [facility]UN[place] Screening    Row Name 22 [id number]         County Benefits     Is patient requesting help applying for Psychiatric hospital benefits? Yes         Have you recently applied for any Psychiatric hospital benefits? No         How many people in your household? 4  pt, , 2 children         Do you buy/eat food together? Yes         What is the monthly gross income for the household (wages, social security, workers comp, and pension)?  2400    is the only one that works and is paid in cash                   Insurance:     [place]? Yes         Is this an insurance renewal? No         Is this a new insurance application request? Yes         Have you recently applied for insurance? No         How many people in your household?  4         Do you file taxes? No  She believes they paid taxes for 2020. No longer pays taxes, he did in the past but lost his old job due to COVID         What is the monthly gross income for the household (wages, social security, workers comp, and pension)?  2400                   OTHER     Is this a darian care application? No         Any other information for the FRW? In previous pregnancies she had MA.  works, with previous pregnancies he paid taxes but lost his job due to COVID and for the past year has been paid in cash.                 Goals Addressed:

## 2022-01-14 ENCOUNTER — APPOINTMENT (OUTPATIENT)
Dept: CARE COORDINATION | Facility: CLINIC | Age: 42
End: 2022-01-14
Payer: MEDICAID

## 2022-01-14 DIAGNOSIS — N39.0 UTI (URINARY TRACT INFECTION): Primary | ICD-10-CM

## 2022-01-14 LAB — BACTERIA UR CULT: ABNORMAL

## 2022-01-14 RX ORDER — CEPHALEXIN 500 MG/1
500 CAPSULE ORAL 2 TIMES DAILY
Qty: 14 CAPSULE | Refills: 0 | Status: SHIPPED | OUTPATIENT
Start: 2022-01-14 | End: 2022-01-21

## 2022-01-17 LAB — SCANNED LAB RESULT: NORMAL

## 2022-01-18 ENCOUNTER — TRANSCRIBE ORDERS (OUTPATIENT)
Dept: MATERNAL FETAL MEDICINE | Facility: CLINIC | Age: 42
End: 2022-01-18
Payer: COMMERCIAL

## 2022-01-18 ENCOUNTER — APPOINTMENT (OUTPATIENT)
Dept: INTERPRETER SERVICES | Facility: CLINIC | Age: 42
End: 2022-01-18
Payer: MEDICAID

## 2022-01-18 DIAGNOSIS — O34.11 UTERINE FIBROIDS AFFECTING PREGNANCY IN FIRST TRIMESTER: ICD-10-CM

## 2022-01-18 DIAGNOSIS — D25.9 UTERINE FIBROIDS AFFECTING PREGNANCY IN FIRST TRIMESTER: ICD-10-CM

## 2022-01-18 DIAGNOSIS — O26.90 PREGNANCY RELATED CONDITION, ANTEPARTUM: Primary | ICD-10-CM

## 2022-01-18 DIAGNOSIS — D25.9 FIBROID UTERUS: Primary | ICD-10-CM

## 2022-01-19 ENCOUNTER — DOCUMENTATION ONLY (OUTPATIENT)
Dept: OBGYN | Facility: CLINIC | Age: 42
End: 2022-01-19
Payer: COMMERCIAL

## 2022-01-19 DIAGNOSIS — O09.522 MULTIGRAVIDA OF ADVANCED MATERNAL AGE IN SECOND TRIMESTER: Primary | ICD-10-CM

## 2022-01-20 ENCOUNTER — APPOINTMENT (OUTPATIENT)
Dept: INTERPRETER SERVICES | Facility: CLINIC | Age: 42
End: 2022-01-20
Payer: MEDICAID

## 2022-01-20 NOTE — PROGRESS NOTES
Patient needs to be contacted w results by provider  [facility] apt not until 1/28/2022      Cady Azevedo RN on 1/20/2022 at 8:36 AM

## 2022-01-20 NOTE — PROGRESS NOTES
Called patient with interpretor again to discuss the invitae results.  She has an appointment with [facility] 1/28.  Discussed the results and discussed [facility] follow up.  Discussed potential to talk to the genetic counselor at [facility] as well.  Discussed that there is other testing they can offer there as well.  Discussed only way to check the true genetics, is the amniocentesis.

## 2022-01-20 NOTE — PROGRESS NOTES
Called patient and didn't answer phone, rang with no response.  Did MFM or anyone else get a hold of her for follow up planning and to discuss the genetic testing results?

## 2022-01-24 ENCOUNTER — PRE VISIT (OUTPATIENT)
Dept: MATERNAL FETAL MEDICINE | Facility: CLINIC | Age: 42
End: 2022-01-24
Payer: COMMERCIAL

## 2022-01-28 ENCOUNTER — OFFICE VISIT (OUTPATIENT)
Dept: MATERNAL FETAL MEDICINE | Facility: CLINIC | Age: 42
End: 2022-01-28
Attending: OBSTETRICS & GYNECOLOGY
Payer: MEDICAID

## 2022-01-28 ENCOUNTER — HOSPITAL ENCOUNTER (OUTPATIENT)
Dept: ULTRASOUND IMAGING | Facility: CLINIC | Age: 42
End: 2022-01-28
Attending: OBSTETRICS & GYNECOLOGY
Payer: MEDICAID

## 2022-01-28 DIAGNOSIS — O34.11 UTERINE FIBROIDS AFFECTING PREGNANCY IN FIRST TRIMESTER: ICD-10-CM

## 2022-01-28 DIAGNOSIS — O26.90 PREGNANCY RELATED CONDITION, ANTEPARTUM: ICD-10-CM

## 2022-01-28 DIAGNOSIS — O09.522 SUPERVISION OF ELDERLY MULTIGRAVIDA IN SECOND TRIMESTER: ICD-10-CM

## 2022-01-28 DIAGNOSIS — D25.9 UTERINE FIBROIDS AFFECTING PREGNANCY IN FIRST TRIMESTER: ICD-10-CM

## 2022-01-28 DIAGNOSIS — O28.5 ABNORMAL GENETIC TEST IN PREGNANCY: Primary | ICD-10-CM

## 2022-01-28 PROCEDURE — 96040 HC GENETIC COUNSELING, EACH 30 MINUTES: CPT | Performed by: GENETIC COUNSELOR, MS

## 2022-01-28 PROCEDURE — 99204 OFFICE O/P NEW MOD 45 MIN: CPT | Mod: 25 | Performed by: OBSTETRICS & GYNECOLOGY

## 2022-01-28 PROCEDURE — 76805 OB US >/= 14 WKS SNGL FETUS: CPT | Mod: 26 | Performed by: OBSTETRICS & GYNECOLOGY

## 2022-01-28 PROCEDURE — 76805 OB US >/= 14 WKS SNGL FETUS: CPT

## 2022-01-28 NOTE — PROGRESS NOTES
Dear Dr. Bernard,     Thank you for your request for an [facility] consultation on Ms. Anh Roberts regarding her abnormal NIPT result.     Ms. Evangelista is a 42 y/o  @ 15 3/7 weeks by 10 2/7 weeks US here for consultation.  Her pregnancy is complicated by a large uterine fibroid, AMA and an abnormal NIPT: non specific genomic imbalances detected.  She has no complaints today.     PMH: fibroid, anemia  PSH: none  Meds: iron, PNV  all: ASA  FH: see GC consultation for details  Social: , denies t/e/d use  Ob hx:   2003- term , male, normal weight, uncomplicated   - term , female, normal weight, uncomplicated     42 y/o  @ 15 3/7 weeks with large uterine fibroid and abnormal NIPT.       Fibroids:     While fibroids are most often asymptomatic and associated with good prognosis in pregnancy, they may increase the risk of spontaneous  and have been associated with recurrent pregnancy loss. During pregnancy, most fibroids do not exhibit a significant change in volume; those that do increase in size tend to do so primarily in the first trimester.  Large submucosal and retroplacental fibroids that distort the uterine cavity may be associated with adverse pregnancy events, including pain, vaginal bleeding, placental abruption, fetal growth restriction,  labor and obstructive labor.  Pain is one of the most common complications of fibroids in pregnancy.     Fibroids are not an indication for  delivery unless they are obstructive- in this case I am concerned about obstruction.  A vertical skin incision and a posterior or classical hysterotomy may be necessary to obtain adequate exposure if the fibroids are located in the lower uterine segment and careful preoperative planning is suggested.  Every effort should be made to avoid cutting through fibroids as it can be impossible to close the hysterotomy site if the fibroids are in it.     Recommendations:    Acetaminophen or  narcotic analgesia for pain if needed.    A short course of NSAIDs can be considered for limited use prior to 32 weeks of gestation, with no more than 48 hours total duration.    Recommend serial growth US following the comprehensive US to screen for FGR.     Prepare for possible postpartum hemorrhage.   Patient is already anemic and is on ferrous sulfate.      delivery should be reserved for routine obstetric indications.       Abnormal NIPT:     She had cell free fetal DNA drawn this pregnancy which failed due to multiple copy number variants being detected. This is a rare abnormality and is NOT due to a lab processing error and therefore redraw is not recommended. The differential diagnosis of this result includes a complex fetal chromosomal abnormality or an underlying maternal condition such as an autoimmune disease, MATERNAL FIBROIDS, and most concerning maternal malignancy which has been reported in the setting of a normal ultrasound to be as high as 41.9%.    These findings were discussed with Anh and her partner. We discussed that in the setting of a normal (early) ultrasound today the likelihood of this result being secondary to a complex fetal chromosomal abnormality becomes less likely, however, is not completely excluded. We discussed the availability of amniocentesis for the precise diagnosis of chromosomal abnormalities and the patient declined.    The likely explanation for this result is maternal fibroids, but we discussed that we would recommend evaluation for maternal malignancy. Screening for malignancy in pregnant women should focus on those malignancies most commonly identified in reproductive-aged women and should utilize screening tests that are minimally invasive and cost-effective. A complete history and physical examination are suggested as an initial step to guide further evaluation, including a comprehensive review of systems.    Today she denies any constitutional symptoms  including fatigue, night sweats, or unintentional weight loss.   Limited exam was performed today which did not demonstrate any cervical or axilla lymphadenopathy or thyromegaly.  In addition to the exam performed today we would recommend a comprehensive examination at her next prentatal visit including a thorough evaluation of the skin, oropharynx, neck, thyroid, breast, lung, and abdomen as well as a pelvic and rectal examination. Any atypical-appearing nevi warrant biopsy and dermatology evaluation.  Additionally she does not appear to have had a PAP smear since 2007.  Given cervical cancer is the third most common malignancy diagnosed in pregnancy we would recommend this be performed at her next visit as well.    We would also suggest performing further evaluation including iron studies, B12 and folate as B12 defiiciency can lead to this NIPT result.  Consider Hgb electrophoresis if not performed in the past.      A peripheral blood smear can be performed.  Leukocytosis, thrombocytopenia, or atypical leukocytes would trigger further evaluation. Screening for fecal occult blood is an inexpensive and noninvasive screening modality for colorectal cancer and this is recommended to be done at her next visit. A chest radiograph is also recommended to evaluate for lung and mediastinal lesions.     I spent a total of 45 minutes face-to-face with Anh Roberts   - 40 min face to face  - 5 min jacob Johnson, DO  Maternal Fetal Medicine    A copy of this consultation is being sent to your office.

## 2022-01-28 NOTE — PROGRESS NOTES
[facility]  Genetic Counseling Consult    Patient:  Anh Roberts YOB: 1980   Date of Service:  22      Anh Roberts was seen at the [facility] for genetic consultation as part of her appointment for MFM consult and early comprehensive ultrasound.  The indication for genetic counseling is abnormal finding on NIPT: no result due to non-specific genomic imbalances. Today's appointment was facilitated by a telephone Macedonian  Carri, ID# [id number]. Anh was accompanied to today's appointment by her  Diogenes       Impression/Plan:   1. Anh had a cell-free fetal DNA test earlier in pregnancy, which was a failed test: Results unavailable due to non-specific genomic imbalances detected. This result can reflect fetal chromosome abnormalities or maternal health conditions.  Maternal conditions can include B12 deficiency, benign tumors, uterine fibroids, or maternal malignancy.      2. The failed NIPT is likely due to the known large uterine fibroid for Anh, however other maternal health complications, including malignancy, cannot be ruled out.  Additional assessment for other maternal health concerns is recommended. Please see corresponding documentation from Anh's MFM consult today for detailed recommendations.      3. Repeat NIPT is not recommended, and the fetal risks for chromosome abnormality are still considered increased due to advanced maternal age.  The option of diagnostic testing via amniocentesis was discussed and declined at this time.      Pregnancy History:   /Parity:    Age at Delivery: 41 year old  JENNIFER: 2022, by Last Menstrual Period  Gestational Age: 15w3d    No significant complications or exposures were reported in the current pregnancy.    Anh s pregnancy history is significant for 2 prior full term pregnancies with no reported obstetric  complications.  This is Anh's third pregnancy with her  Diogenes.    Medical History:   Anh s reported medical history is not expected to impact pregnancy management or risks to fetal development.       Family History:   A three-generation pedigree was obtained, and is scanned under the  Media  tab.   The reported family history is negative for any known cancer diagnosis, multiple miscarriages, stillbirths, birth defects, cognitive impairment, known genetic conditions, and consanguinity.       Carrier Screening:       Expanded carrier screening for mutations in a large panel of genes associated with autosomal recessive conditions including cystic fibrosis, spinal muscular atrophy, and others, is now available.      Carrier screening was beyond the scope of our discussion today.        Risk Assessment for Chromosome Conditions:   We explained that the risk for fetal chromosome abnormalities increases with maternal age. We discussed specific features of common chromosome abnormalities, including Down syndrome, trisomy 13, trisomy 18, and sex chromosome trisomies.      - At age 41 at midtrimester, the risk to have a baby with Down syndrome is 1 in 56.     - At age 41 at midtrimester, the risk to have a baby with any chromosome abnormality is 1 in 31.       Anh had maternal serum screening earlier in pregnancy.    We briefly reviewed the conditions typically associated with advanced maternal age, and the probabilities for these conditions in Anh's pregnancy.  Her failed NIPT results do not demonstrate a specific increased risk for any one particular chromosome abnormality, and we discussed that amniocentesis is available as an option for diagnostic testing for these purposes.  Repeat NIPT is not recommended.  Anh and Diogenes declined planning for diagnostic testing at this time because of the reassuring fetal ultrasound today, as well as the risks associated with amniocentesis.      Anh's NIPT  testing returned with the following:     Results are unavailable: non-specific genomic imbalances detected    NIPT works by assessing DNA material present in maternal blood, both maternal and placental, to assess for abnormal levels of specific chromosomes, typically those associated with common aneuploidy such as trisomy 21. If testing detects abnormal chromosome levels involving chromosomes outside the scope of the test, or detects wide spread chromosome imbalances involving multiple chromosomes, it cannot accurately provide a risks assessment for the intended conditions.  There are multiple different causes for wide spread genomic imbalances detected in cell-free DNA screening, including a pregnancy/placenta affected with multiple chromosome imbalances and the following maternal health conditions: Vitamin B12 deficiency, benign tumors, uterine fibroids, and maternal malignancies.      Anh's pregnancy has a large uterine fibroid identified on all of her prenatal scans, and we discussed that the most likely explanation for her failed NIPT is this fibroid.  However, risks for the other possible causes cannot be ruled out. ACOG practice bulletin 226 makes the following care recommendations:       Family history assessing for any cancer syndromes (completed with genetic counseling today, negative    Physical examination for lymphadenopathy, breast, and thyroid masses    Review of complete blood count and metabolic panel    Pap test (review of records indicates last pap for Anh completed in 2007    Fecal occult blood testing    Consider oncology consultation and imaging      Anh met with [facility] physician Dr. Johnson today to discuss recommendations for management and assessment for other possible maternal health concerns. Please see corresponding documentation from Anh's [facility] consult 1/28/2022 for complete details and specific recommendations.         Testing Options:   We discussed the following  options:   Non-invasive Prenatal Testing (NIPT)    Maternal plasma cell-free DNA testing; first trimester ultrasound with nuchal translucency and nasal bone assessment is recommended, when appropriate    Screens for fetal trisomy 21, trisomy 13, trisomy 18, and sex chromosome aneuploidy    Cannot screen for open neural tube defects; maternal serum AFP after 15 weeks is recommended       Genetic Amniocentesis    Invasive procedure typically performed in the second trimester by which amniotic fluid is obtained for the purpose of chromosome analysis and/or other prenatal genetic analysis    Diagnostic results; >99% sensitivity for fetal chromosome abnormalities    AFAFP measurement tests for open neural tube defects       Comprehensive (Level II) ultrasound: Detailed ultrasound performed between 18-22 weeks gestation to screen for major birth defects and markers for aneuploidy.        We reviewed the benefits and limitations of this testing.  Screening tests provide a risk assessment specific to the pregnancy for certain fetal chromosome abnormalities, but cannot definitively diagnose or exclude a fetal chromosome abnormality.  Follow-up genetic counseling and consideration of diagnostic testing is recommended with any abnormal screening result.     Diagnostic tests carry inherent risks- including risk of miscarriage- that require careful consideration.  These tests can detect fetal chromosome abnormalities with greater than 99% certainty.  Results can be compromised by maternal cell contamination or mosaicism, and are limited by the resolution of cytogenetic G-banding technology.  There is no screening nor diagnostic test that can detect all forms of birth defects or mental disability.    It was a pleasure to be involved with Anh s care. Face-to-face time of the meeting was 45 minutes.    Kash Salas MS, [facility]  Licensed Genetic Counselor  Phone: [phone]  Pager: [phone]

## 2022-01-28 NOTE — NURSING NOTE
Anh seen in clinic today for 2/3 complete (see ultrasound report) and MFM consult at 15w3d gestation for pregnancy complicated by uterine fibroid, AMA and abnormal NIPT (see report/notes). Medications, allergies, and goals discussed. Dr. Johnson met with pt and discussed POC (see note). Plan for L2 in 3-4 wks. Future visits scheduled at . Pt discharged stable and ambulatory.

## 2022-02-04 ENCOUNTER — PATIENT OUTREACH (OUTPATIENT)
Dept: CARE COORDINATION | Facility: CLINIC | Age: 42
End: 2022-02-04
Payer: COMMERCIAL

## 2022-02-04 NOTE — PROGRESS NOTES
Clinic Care Coordination Contact  Program: Cara.   County:  [facility] Case #:  [facility] Worker:   Cara #:   Subscriber #:   Renewal:  Date Applied:     BRUNO Outreach:   2022: FRW tried to call the patient twice with  and no answer FRW was not able to leave a message.   2022: FRW called and completed papper application with patient over the phone and mailed it out to her to sing. FRW will follow up in 3 weeks   2022: FRW called and completed screening and patient stated that she only needed help with insurance due to she is pregnant. FRW set up a time to complete a paper application on 2022 at 1pm.   SNAP/CASH Application Screenin. Have you had [facility] benefits before? No   2. How many people in the household, do you eat/buy food together? 4  3. What is your monthly income (include all tax members)? 2,500.00 hudband paid in cash.   4. Do you have a bank account?    5. Do you have any utility bills (electricity, rent, mortgage, phone, insurance, medical bills, etc.)? No   6. Do you have social security cards and/or green cards?No      Health Insurance Screening: CARA   1. Do you currently have health insurance, did you receive a renewal? My kids have Medical .   2. If you applied through [facility], do you know your username/password?   3. How many people in the household? 4   4. Do you file taxes, who do you file with?  No   5. What is your monthly income (include all tax members)? 2,500  6. Do you have access to insurance through an employer (if yes, need EIN)? No   7. Do you have social security cards and/or green cards? No.       Health Insurance:      Referral/Screening:  FRW Screening    Row Name 22 [id number]         County Benefits     Is patient requesting help applying for [facility] benefits? Yes         Have you recently applied for any [facility] benefits? No         How many people in your household? 4  pt, , 2 children         Do you buy/eat food together? Yes          What is the monthly gross income for the household (wages, social security, workers comp, and pension)?  2400   is the only one that works and is paid in cash                   Insurance:     [place] insurance? Yes         Is this an insurance renewal? No         Is this a new insurance application request? Yes         Have you recently applied for insurance? No         How many people in your household?  4         Do you file taxes? No  She believes they paid taxes for 2020. No longer pays taxes, he did in the past but lost his old job due to COVID         What is the monthly gross income for the household (wages, social security, workers comp, and pension)?  2400                   OTHER     Is this a darian care application? No         Any other information for the FRW? In previous pregnancies she had [place].  works, with previous pregnancies he paid taxes but lost his job due to COVID and for the past year has been paid in cash.                 Goals Addressed:

## 2022-02-08 ENCOUNTER — APPOINTMENT (OUTPATIENT)
Dept: INTERPRETER SERVICES | Facility: CLINIC | Age: 42
End: 2022-02-08
Payer: MEDICAID

## 2022-02-10 ENCOUNTER — APPOINTMENT (OUTPATIENT)
Dept: INTERPRETER SERVICES | Facility: CLINIC | Age: 42
End: 2022-02-10
Payer: MEDICAID

## 2022-02-14 ENCOUNTER — PATIENT OUTREACH (OUTPATIENT)
Dept: CARE COORDINATION | Facility: CLINIC | Age: 42
End: 2022-02-14
Payer: COMMERCIAL

## 2022-02-15 ENCOUNTER — PATIENT OUTREACH (OUTPATIENT)
Dept: NURSING | Facility: CLINIC | Age: 42
End: 2022-02-15
Payer: MEDICAID

## 2022-02-15 NOTE — LETTER
[facility]  [address] [place] [address]    February 15, 2022    Anh Roberts  [address]  [facility] [address]    Dear Anh,  Your Care Team congratulates you on your journey to maintain wellness. This document will help guide you on your journey to maintain a healthy lifestyle.  You can use this to help you overcome any barriers you may encounter.  If you should have any questions or concerns, you can contact the members of your Care Team or contact your Primary Care Clinic for assistance.     Health Maintenance  Health Maintenance Reviewed: Due/Overdue   Health Maintenance Due   Topic Date Due     PREVENTIVE CARE VISIT  Never done     ADVANCE CARE PLANNING  Never done     PAP  Never done     DTAP/TDAP/TD IMMUNIZATION (1 - Tdap) Never done     INFLUENZA VACCINE (1) 09/01/2021     PHQ-2  Never done     MATERNAL SCREENING  Never done       My Access Plan  Medical Emergency 911   Primary Clinic Line [facility] - [phone]   24 Hour Appointment Line [phone] or  [phone] ([phone]) (toll-free)   24 Hour Nurse Line [phone] (toll-free)   Preferred Urgent Care     Preferred Hospital     Preferred Pharmacy [facility] - [place], [place] - [address] Behavioral Health Crisis Line The National Suicide Prevention Lifeline at 1-766.803.6949 or 911     My Care Team Members  Patient Care Team       Relationship Specialty Notifications Start End    No Ref-Primary, Physician PCP - General   12/23/21     Fax: [phone]         Kami Felder MD Assigned OBGYN Provider   1/16/22     Phone: [phone] Fax: [phone] [address] [facility] [address]              Goals        Patient Stated       COMPLETED: 1. Medical (pt-stated)       Goal Statement: I will apply for health insurance within the next 1-2 weeks if I am eligible.   Date Goal Set:  1/11/2022  Strengths:  Strong  support system  Barriers: none  Date to Achieve By: 2/11/2022  Patient expressed understanding of goal: yes  Action steps to achieve this goal:  1. I understand a referral was placed to the Financial Resource Worker, I will receive a call within the next 3 business days.    2. I understand the financial worker will make two attempts to call me. If I still need help with this goal, I will connect with my Care Coordination  Aparna at [phone].                    It has been your Clinic Care Team's pleasure to work with you on your goals.    Regards,  Your Clinic Care Team

## 2022-02-15 NOTE — PROGRESS NOTES
Clinic Care Coordination Contact    Follow Up Progress Note      Assessment: CC SW spoke with pt with . Pt shared that she now has medical insurance through [place]. She has no further concerns or needs for CC LYNNE to assist with.    Care Gaps:    Health Maintenance Due   Topic Date Due     PREVENTIVE CARE VISIT  Never done     ADVANCE CARE PLANNING  Never done     PAP  Never done     DTAP/TDAP/TD IMMUNIZATION (1 - Tdap) Never done     INFLUENZA VACCINE (1) 09/01/2021     PHQ-2  Never done     MATERNAL SCREENING  Never done     Care Gaps Last addressed on 1/28/2022    Goals addressed this encounter:   Goals Addressed                    This Visit's Progress       Patient Stated       COMPLETED: 1. Medical (pt-stated)         Goal Statement: I will apply for health insurance within the next 1-2 weeks if I am eligible.   Date Goal Set:  1/11/2022  Strengths:  Strong support system  Barriers: none  Date to Achieve By: 2/11/2022  Patient expressed understanding of goal: yes  Action steps to achieve this goal:  1. I understand a referral was placed to the Financial Resource Worker, I will receive a call within the next 3 business days.    2. I understand the financial worker will make two attempts to call me. If I still need help with this goal, I will connect with my Care Coordination  Aparna at [phone].                 Plan: At this time, pt denies outstanding need for connection or referral to resources or assistance navigating recommended follow up care. No further outreaches will be made at this time unless a new referral is made or a change in the pt's status occurs. Patient was provided with CC LYNNE contact information and encouraged to call with any questions or concerns.    Aparna Means, [facility]  Clinic Care Coordinator  [facility]  [place]  [phone]  [email or web address]

## 2022-02-16 ENCOUNTER — TELEPHONE (OUTPATIENT)
Dept: OBGYN | Facility: CLINIC | Age: 42
End: 2022-02-16

## 2022-02-16 ENCOUNTER — PRENATAL OFFICE VISIT (OUTPATIENT)
Dept: OBGYN | Facility: CLINIC | Age: 42
End: 2022-02-16
Payer: MEDICAID

## 2022-02-16 VITALS — WEIGHT: 135 LBS | DIASTOLIC BLOOD PRESSURE: 60 MMHG | SYSTOLIC BLOOD PRESSURE: 102 MMHG | BODY MASS INDEX: 26.37 KG/M2

## 2022-02-16 DIAGNOSIS — O09.522 MULTIGRAVIDA OF ADVANCED MATERNAL AGE IN SECOND TRIMESTER: Primary | ICD-10-CM

## 2022-02-16 LAB
BASOPHILS # BLD AUTO: 0 10E3/UL (ref 0–0.2)
BASOPHILS NFR BLD AUTO: 0 %
EOSINOPHIL # BLD AUTO: 0.1 10E3/UL (ref 0–0.7)
EOSINOPHIL NFR BLD AUTO: 1 %
ERYTHROCYTE [DISTWIDTH] IN BLOOD BY AUTOMATED COUNT: ABNORMAL %
HCT VFR BLD AUTO: 35.4 % (ref 35–47)
HGB BLD-MCNC: 10.9 G/DL (ref 11.7–15.7)
IMM GRANULOCYTES # BLD: 0 10E3/UL
IMM GRANULOCYTES NFR BLD: 1 %
LYMPHOCYTES # BLD AUTO: 1 10E3/UL (ref 0.8–5.3)
LYMPHOCYTES NFR BLD AUTO: 17 %
MCH RBC QN AUTO: 27.2 PG (ref 26.5–33)
MCHC RBC AUTO-ENTMCNC: 30.8 G/DL (ref 31.5–36.5)
MCV RBC AUTO: 88 FL (ref 78–100)
MONOCYTES # BLD AUTO: 0.5 10E3/UL (ref 0–1.3)
MONOCYTES NFR BLD AUTO: 8 %
NEUTROPHILS # BLD AUTO: 4.5 10E3/UL (ref 1.6–8.3)
NEUTROPHILS NFR BLD AUTO: 73 %
NRBC # BLD AUTO: 0 10E3/UL
NRBC BLD AUTO-RTO: 0 /100
PLATELET # BLD AUTO: 266 10E3/UL (ref 150–450)
RBC # BLD AUTO: 4.01 10E6/UL (ref 3.8–5.2)
RETICS # AUTO: 0.06 10E6/UL (ref 0.03–0.1)
RETICS/RBC NFR AUTO: 1.4 % (ref 0.5–2)
VIT B12 SERPL-MCNC: 232 PG/ML (ref 193–986)
WBC # BLD AUTO: 6.1 10E3/UL (ref 4–11)

## 2022-02-16 PROCEDURE — 85045 AUTOMATED RETICULOCYTE COUNT: CPT | Performed by: OBSTETRICS & GYNECOLOGY

## 2022-02-16 PROCEDURE — 99212 OFFICE O/P EST SF 10 MIN: CPT | Performed by: OBSTETRICS & GYNECOLOGY

## 2022-02-16 PROCEDURE — 82728 ASSAY OF FERRITIN: CPT | Performed by: OBSTETRICS & GYNECOLOGY

## 2022-02-16 PROCEDURE — 82105 ALPHA-FETOPROTEIN SERUM: CPT | Mod: 90 | Performed by: OBSTETRICS & GYNECOLOGY

## 2022-02-16 PROCEDURE — 80053 COMPREHEN METABOLIC PANEL: CPT | Performed by: OBSTETRICS & GYNECOLOGY

## 2022-02-16 PROCEDURE — 85060 BLOOD SMEAR INTERPRETATION: CPT | Performed by: PATHOLOGY

## 2022-02-16 PROCEDURE — 82306 VITAMIN D 25 HYDROXY: CPT | Performed by: OBSTETRICS & GYNECOLOGY

## 2022-02-16 PROCEDURE — 83021 HEMOGLOBIN CHROMOTOGRAPHY: CPT | Mod: 90 | Performed by: OBSTETRICS & GYNECOLOGY

## 2022-02-16 PROCEDURE — 85660 RBC SICKLE CELL TEST: CPT | Mod: 90 | Performed by: OBSTETRICS & GYNECOLOGY

## 2022-02-16 PROCEDURE — 85025 COMPLETE CBC W/AUTO DIFF WBC: CPT | Performed by: OBSTETRICS & GYNECOLOGY

## 2022-02-16 PROCEDURE — 36415 COLL VENOUS BLD VENIPUNCTURE: CPT | Performed by: OBSTETRICS & GYNECOLOGY

## 2022-02-16 PROCEDURE — 83020 HEMOGLOBIN ELECTROPHORESIS: CPT | Mod: 90 | Performed by: OBSTETRICS & GYNECOLOGY

## 2022-02-16 PROCEDURE — 82607 VITAMIN B-12: CPT | Performed by: OBSTETRICS & GYNECOLOGY

## 2022-02-16 PROCEDURE — 99000 SPECIMEN HANDLING OFFICE-LAB: CPT | Performed by: OBSTETRICS & GYNECOLOGY

## 2022-02-16 NOTE — TELEPHONE ENCOUNTER
Type of Paperwork received:  FMLA     Date Rcvd:  2/16/2022    Rcvd From (Company name): [employer]    Provider:  Dr. Bernard    Placed on Provider Cart Date:  2/16/2022

## 2022-02-16 NOTE — PROGRESS NOTES
Has level 2 ultrasound scheduled with [facility]  Discussed the recommendations from [facility] with patient  She is to have several blood tests today, chest xray is ordered and number given to patient to schedule  She should look for any blood in her stool, FIT testing ordered as well  She would be due for mammogram, breast us ordered.

## 2022-02-17 LAB
ALBUMIN SERPL-MCNC: 2.7 G/DL (ref 3.4–5)
ALP SERPL-CCNC: 45 U/L (ref 40–150)
ALT SERPL W P-5'-P-CCNC: 29 U/L (ref 0–50)
ANION GAP SERPL CALCULATED.3IONS-SCNC: 10 MMOL/L (ref 3–14)
AST SERPL W P-5'-P-CCNC: 21 U/L (ref 0–45)
BILIRUB SERPL-MCNC: 0.1 MG/DL (ref 0.2–1.3)
BUN SERPL-MCNC: 7 MG/DL (ref 7–30)
CALCIUM SERPL-MCNC: 8.5 MG/DL (ref 8.5–10.1)
CHLORIDE BLD-SCNC: 109 MMOL/L (ref 94–109)
CO2 SERPL-SCNC: 18 MMOL/L (ref 20–32)
CREAT SERPL-MCNC: 0.39 MG/DL (ref 0.52–1.04)
DEPRECATED CALCIDIOL+CALCIFEROL SERPL-MC: 24 UG/L (ref 20–75)
FERRITIN SERPL-MCNC: 32 NG/ML (ref 12–150)
GFR SERPL CREATININE-BSD FRML MDRD: >90 ML/MIN/1.73M2
GLUCOSE BLD-MCNC: 82 MG/DL (ref 70–99)
PATH REPORT.COMMENTS IMP SPEC: NORMAL
PATH REPORT.COMMENTS IMP SPEC: NORMAL
PATH REPORT.FINAL DX SPEC: NORMAL
PATH REPORT.MICROSCOPIC SPEC OTHER STN: NORMAL
PATH REPORT.MICROSCOPIC SPEC OTHER STN: NORMAL
PATH REPORT.RELEVANT HX SPEC: NORMAL
POTASSIUM BLD-SCNC: 3.8 MMOL/L (ref 3.4–5.3)
PROT SERPL-MCNC: 6.9 G/DL (ref 6.8–8.8)
SODIUM SERPL-SCNC: 137 MMOL/L (ref 133–144)

## 2022-02-18 LAB
# FETUSES US: NORMAL
AFP MOM SERPL: 1.08
AFP SERPL-MCNC: 53 NG/ML
AGE - REPORTED: 41.9 YR
CURRENT SMOKER: NO
FAMILY MEMBER DISEASES HX: NO
GA METHOD: NORMAL
GA: NORMAL WK
HGB A1 MFR BLD: 96.5 %
HGB A2 MFR BLD: 3 %
HGB C MFR BLD: 0 %
HGB E MFR BLD: 0 %
HGB F MFR BLD: 0.5 %
HGB FRACT BLD ELPH-IMP: NORMAL
HGB OTHER MFR BLD: 0 %
HGB S BLD QL SOLY: NORMAL
HGB S MFR BLD: 0 %
IDDM PATIENT QL: NO
INTEGRATED SCN PATIENT-IMP: NORMAL
PATH INTERP BLD-IMP: NORMAL
SPECIMEN DRAWN SERPL: NORMAL

## 2022-02-23 ENCOUNTER — HOSPITAL ENCOUNTER (OUTPATIENT)
Dept: ULTRASOUND IMAGING | Facility: CLINIC | Age: 42
End: 2022-02-23
Attending: OBSTETRICS & GYNECOLOGY
Payer: MEDICAID

## 2022-02-23 ENCOUNTER — HOSPITAL ENCOUNTER (OUTPATIENT)
Dept: GENERAL RADIOLOGY | Facility: CLINIC | Age: 42
End: 2022-02-23
Attending: OBSTETRICS & GYNECOLOGY
Payer: MEDICAID

## 2022-02-23 ENCOUNTER — HOSPITAL ENCOUNTER (OUTPATIENT)
Dept: MAMMOGRAPHY | Facility: CLINIC | Age: 42
End: 2022-02-23
Attending: OBSTETRICS & GYNECOLOGY
Payer: MEDICAID

## 2022-02-23 ENCOUNTER — OFFICE VISIT (OUTPATIENT)
Dept: MATERNAL FETAL MEDICINE | Facility: CLINIC | Age: 42
End: 2022-02-23
Attending: OBSTETRICS & GYNECOLOGY
Payer: MEDICAID

## 2022-02-23 DIAGNOSIS — O09.522 MULTIGRAVIDA OF ADVANCED MATERNAL AGE IN SECOND TRIMESTER: ICD-10-CM

## 2022-02-23 DIAGNOSIS — D25.9 UTERINE FIBROID DURING PREGNANCY, ANTEPARTUM: Primary | ICD-10-CM

## 2022-02-23 DIAGNOSIS — O34.10 UTERINE FIBROID DURING PREGNANCY, ANTEPARTUM: Primary | ICD-10-CM

## 2022-02-23 DIAGNOSIS — O09.522 AMA (ADVANCED MATERNAL AGE) MULTIGRAVIDA 35+, SECOND TRIMESTER: ICD-10-CM

## 2022-02-23 DIAGNOSIS — O26.90 PREGNANCY RELATED CONDITION, ANTEPARTUM: ICD-10-CM

## 2022-02-23 PROCEDURE — 71046 X-RAY EXAM CHEST 2 VIEWS: CPT

## 2022-02-23 PROCEDURE — 76811 OB US DETAILED SNGL FETUS: CPT | Mod: 26 | Performed by: OBSTETRICS & GYNECOLOGY

## 2022-02-23 PROCEDURE — 76811 OB US DETAILED SNGL FETUS: CPT

## 2022-02-23 PROCEDURE — 82274 ASSAY TEST FOR BLOOD FECAL: CPT | Performed by: OBSTETRICS & GYNECOLOGY

## 2022-02-23 PROCEDURE — 77062 BREAST TOMOSYNTHESIS BI: CPT

## 2022-02-23 NOTE — PROGRESS NOTES
"Please see \"Imaging\" tab under \"Chart Review\" for details of today's visit.    Nicolette Villaseñor    "

## 2022-02-25 DIAGNOSIS — O99.012 ANEMIA AFFECTING PREGNANCY IN SECOND TRIMESTER: Primary | ICD-10-CM

## 2022-02-25 LAB — HEMOCCULT STL QL IA: NEGATIVE

## 2022-02-25 RX ORDER — FERROUS SULFATE 325(65) MG
325 TABLET ORAL
Qty: 180 TABLET | Refills: 3 | Status: SHIPPED | OUTPATIENT
Start: 2022-02-25 | End: 2022-05-09

## 2022-02-25 RX ORDER — FERROUS SULFATE 325(65) MG
325 TABLET ORAL 2 TIMES DAILY
Qty: 180 TABLET | Refills: 3 | Status: SHIPPED | OUTPATIENT
Start: 2022-02-25 | End: 2022-02-25

## 2022-03-23 ENCOUNTER — HOSPITAL ENCOUNTER (OUTPATIENT)
Dept: ULTRASOUND IMAGING | Facility: CLINIC | Age: 42
Discharge: HOME | End: 2022-03-23
Attending: OBSTETRICS & GYNECOLOGY
Payer: MEDICAID

## 2022-03-23 ENCOUNTER — OFFICE VISIT (OUTPATIENT)
Dept: MATERNAL FETAL MEDICINE | Facility: CLINIC | Age: 42
End: 2022-03-23
Attending: OBSTETRICS & GYNECOLOGY
Payer: MEDICAID

## 2022-03-23 DIAGNOSIS — O09.522 AMA (ADVANCED MATERNAL AGE) MULTIGRAVIDA 35+, SECOND TRIMESTER: ICD-10-CM

## 2022-03-23 DIAGNOSIS — D25.9 UTERINE FIBROID DURING PREGNANCY, ANTEPARTUM: ICD-10-CM

## 2022-03-23 DIAGNOSIS — O34.10 UTERINE FIBROID DURING PREGNANCY, ANTEPARTUM: ICD-10-CM

## 2022-03-23 DIAGNOSIS — O09.522 ADVANCED MATERNAL AGE IN MULTIGRAVIDA, SECOND TRIMESTER: Primary | ICD-10-CM

## 2022-03-23 PROCEDURE — 76816 OB US FOLLOW-UP PER FETUS: CPT

## 2022-03-23 PROCEDURE — 76816 OB US FOLLOW-UP PER FETUS: CPT | Mod: 26 | Performed by: OBSTETRICS & GYNECOLOGY

## 2022-03-23 NOTE — PROGRESS NOTES
Please see the imaging tab for details of the ultrasound performed today.    Riya Walker MD  Specialist in Maternal-Fetal Medicine

## 2022-03-24 ENCOUNTER — PRENATAL OFFICE VISIT (OUTPATIENT)
Dept: MIDWIFE SERVICES | Facility: CLINIC | Age: 42
End: 2022-03-24
Payer: MEDICAID

## 2022-03-24 VITALS — DIASTOLIC BLOOD PRESSURE: 54 MMHG | SYSTOLIC BLOOD PRESSURE: 100 MMHG | BODY MASS INDEX: 27.22 KG/M2 | WEIGHT: 139.4 LBS

## 2022-03-24 DIAGNOSIS — Z3A.23 23 WEEKS GESTATION OF PREGNANCY: Primary | ICD-10-CM

## 2022-03-24 DIAGNOSIS — O09.522 AMA (ADVANCED MATERNAL AGE) MULTIGRAVIDA 35+, SECOND TRIMESTER: ICD-10-CM

## 2022-03-24 DIAGNOSIS — O09.92 SUPERVISION OF HIGH RISK PREGNANCY IN SECOND TRIMESTER: ICD-10-CM

## 2022-03-24 DIAGNOSIS — O34.12 LEIOMYOMA OF UTERUS AFFECTING PREGNANCY IN SECOND TRIMESTER: ICD-10-CM

## 2022-03-24 DIAGNOSIS — D25.9 LEIOMYOMA OF UTERUS AFFECTING PREGNANCY IN SECOND TRIMESTER: ICD-10-CM

## 2022-03-24 PROCEDURE — 99212 OFFICE O/P EST SF 10 MIN: CPT | Performed by: ADVANCED PRACTICE MIDWIFE

## 2022-03-24 NOTE — PATIENT INSTRUCTIONS
Round Ligament Pain    Pregnancy can entail many normal discomforts. One of those discomforts may be round ligament pain. Round ligament pain occurs as your uterus and your baby grow and the muscles begin to stretch more in your abdomen. Round ligament pain is normal and not dangerous but can be very uncomfortable      Round ligament pain is typically described as an unpleasant sensation that ranges from a sharp knifelike pain to dull intermittent pain in the lower abdominal/suprapubic area of a pregnant woman      Virtually all pregnant women will experience this pain at some point during their pregnancies      It typically manifests between 16 and 20 weeks of pregnancy and can be incredibly bothersome especially for women who remain very active during their pregnancies       Please discuss with your midwife if you are having this type of pain; sometimes the pain can be associated with other medical conditions so it is important for us to assess you just to make sure    Comfort measures    There are certain things you can do to cope with round ligament pain and ease the discomfort you are having      Pregnancy support belt      Tylenol      Hot/ice packs      Baths       Exercise such as yoga and swimming that help stretch muscles      Prenatal massage      Reflexology to waist and pelvic joints       Positioning such as side-lying and hands and knees, make sure your abdomen is  well supported with pillows while doing different positions      Calcium Rich Foods    All premenopausal or women of child-bearing age need to get at least 1000 mg of calcium per day from diet and/or supplementation. Post menopausal women should get at least 1200 mg from diet and/or supplementation.    Food     Amount   Calcium (mg)  Dairy  Yogurt     1 cup   400   Ice Cream/Frozen yogurt 1/2 cup  100  Milk 1% or 2%   1 cup   300  Cheese   1 oz    195-335   Cottage cheese 2%  1 cup   155  Fruits  Orange   1 medium  60  Pear    1  medium  19  Raisins    1/4 cup  18  Vegetables-fresh and/or cooked  Broccoli   1/2 cup  36  Bok David   1/2 cup  79  Marion greens   1/2 cup  15  Carrots    1 medium  19  Iceberg lettuce  4 leaves  16  Nuts, Beans, Seeds  Canned baked beans   1/2 cup  100  Canned red kidney beans 1/2 cup  25-80  Canned navy beans  1/2 cup  64  Tofu with calcium sulfate  1/2 cup  150  Soybeans   1 cup   175  Soy milk    1 cup   10  Almonds    1/4 cup  74  Protein  [place]   3 oz   181  Tuna, canned   3 oz   10  Turkey breast   3.5 oz   10  Egg (large)   1 egg   27  Peanut Butter   2 tbsp   11  Beef     3 oz   9  Chicken   1 leg   15  Grain  Amaranth (uncooked)  1 cup   298  Corn tortilla    1 medium  42  Rice (cooked)   1/2 cup  20  Waffle (frozen ~7in)  1   179  Bagel    1   23  Calcium fortified foods/drinks  Orange juice   1 cup   300  Cereal + milk   3/4 cup + 1/2 cup 400  Rice milk   1 cup   300  Lactaid milk    1 cup         GESTATIONAL DIABETES SCREENING    All pregnant women are screened at least once for diabetes as part of their prenatal care. A woman has gestational diabetes if she has high blood sugars for the first time during pregnancy.      Diabetes can harm your health and the health of your baby.  But if we find the diabetes early in pregnancy we will watch your health closely and prevent further problems.       We will check for gestational diabetes during your visit between 24-28 weeks visit. Please note you can not do this prior to 24 weeks of gestation.      Plan to spend about an hour at the clinic.  When you check in let us know that you will be having your diabetes screening that day.       We will give you a 50 gram glucose drink that you have 5 minutes to consume.  Exactly one hour later you will have draw blood from your arm to check your blood sugar level.      We will call you to let you know if your results are normal.  If the results are normal no more testing will be needed.  If your results are not  normal we will discuss follow up testing with you.        You may eat prior to the testing but it is not recommended to eat or drink very sweet things such as pop, juice, candy or dessert type foods.  Eat a high protein, low carb meals prior to testing.    If you have any questions please call:    [facility]    [phone]

## 2022-03-24 NOTE — PROGRESS NOTES
Feels well overall. No concerns today.   US 3/22/22 with MFM, see result for recommendation. Growth US in 4 weeks  Fetal movement: positive   Denies loss of fluid/vb/contractions   GCT next visit, handout provided, reminded of longer appointment  Tdap next visit; patient agreeable. Reviewed CDC recommendations and partner/family vaccination recommended as well  Need for Rhogam? No, O+    Return to clinic 4 weeks for GCT, growth US and follow-up with MDs due to large fibroid.    Amanda Howard, Student Nurse Midwife  [facility] for [facility]    I was present with the student who participated in the service and in the documentation of the note. I saw and evaluated the patient and agree with the findings and plan of care as documented in the note.      Riya BONILLA CNM

## 2022-04-19 DIAGNOSIS — Z23 NEED FOR TDAP VACCINATION: ICD-10-CM

## 2022-04-19 DIAGNOSIS — Z36.9 ENCOUNTER FOR ANTENATAL SCREENING OF MOTHER: Primary | ICD-10-CM

## 2022-04-27 ENCOUNTER — LAB (OUTPATIENT)
Dept: LAB | Facility: CLINIC | Age: 42
End: 2022-04-27
Attending: ADVANCED PRACTICE MIDWIFE
Payer: COMMERCIAL

## 2022-04-27 ENCOUNTER — ANCILLARY PROCEDURE (OUTPATIENT)
Dept: ULTRASOUND IMAGING | Facility: CLINIC | Age: 42
End: 2022-04-27
Attending: ADVANCED PRACTICE MIDWIFE
Payer: COMMERCIAL

## 2022-04-27 ENCOUNTER — PRENATAL OFFICE VISIT (OUTPATIENT)
Dept: OBGYN | Facility: CLINIC | Age: 42
End: 2022-04-27
Attending: ADVANCED PRACTICE MIDWIFE
Payer: COMMERCIAL

## 2022-04-27 VITALS — SYSTOLIC BLOOD PRESSURE: 116 MMHG | WEIGHT: 144 LBS | BODY MASS INDEX: 28.12 KG/M2 | DIASTOLIC BLOOD PRESSURE: 78 MMHG

## 2022-04-27 DIAGNOSIS — O34.12 LEIOMYOMA OF UTERUS AFFECTING PREGNANCY IN SECOND TRIMESTER: ICD-10-CM

## 2022-04-27 DIAGNOSIS — O09.92 SUPERVISION OF HIGH RISK PREGNANCY IN SECOND TRIMESTER: Primary | ICD-10-CM

## 2022-04-27 DIAGNOSIS — D25.9 LEIOMYOMA OF UTERUS AFFECTING PREGNANCY IN SECOND TRIMESTER: ICD-10-CM

## 2022-04-27 DIAGNOSIS — O09.522 AMA (ADVANCED MATERNAL AGE) MULTIGRAVIDA 35+, SECOND TRIMESTER: ICD-10-CM

## 2022-04-27 DIAGNOSIS — O09.92 SUPERVISION OF HIGH RISK PREGNANCY IN SECOND TRIMESTER: ICD-10-CM

## 2022-04-27 DIAGNOSIS — Z36.9 ENCOUNTER FOR ANTENATAL SCREENING OF MOTHER: ICD-10-CM

## 2022-04-27 DIAGNOSIS — Z3A.28 28 WEEKS GESTATION OF PREGNANCY: ICD-10-CM

## 2022-04-27 DIAGNOSIS — Z23 NEED FOR TDAP VACCINATION: ICD-10-CM

## 2022-04-27 LAB
ERYTHROCYTE [DISTWIDTH] IN BLOOD BY AUTOMATED COUNT: 14.5 % (ref 10–15)
GLUCOSE 1H P 50 G GLC PO SERPL-MCNC: 123 MG/DL (ref 70–129)
HCT VFR BLD AUTO: 38.5 % (ref 35–47)
HGB BLD-MCNC: 12.4 G/DL (ref 11.7–15.7)
MCH RBC QN AUTO: 30.5 PG (ref 26.5–33)
MCHC RBC AUTO-ENTMCNC: 32.2 G/DL (ref 31.5–36.5)
MCV RBC AUTO: 95 FL (ref 78–100)
PLATELET # BLD AUTO: 226 10E3/UL (ref 150–450)
RBC # BLD AUTO: 4.07 10E6/UL (ref 3.8–5.2)
WBC # BLD AUTO: 7 10E3/UL (ref 4–11)

## 2022-04-27 PROCEDURE — 76816 OB US FOLLOW-UP PER FETUS: CPT | Performed by: OBSTETRICS & GYNECOLOGY

## 2022-04-27 PROCEDURE — 85027 COMPLETE CBC AUTOMATED: CPT

## 2022-04-27 PROCEDURE — 82950 GLUCOSE TEST: CPT

## 2022-04-27 PROCEDURE — 99207 PR PRENATAL VISIT: CPT | Performed by: OBSTETRICS & GYNECOLOGY

## 2022-04-27 PROCEDURE — 36415 COLL VENOUS BLD VENIPUNCTURE: CPT

## 2022-04-27 PROCEDURE — 90715 TDAP VACCINE 7 YRS/> IM: CPT | Performed by: OBSTETRICS & GYNECOLOGY

## 2022-04-27 PROCEDURE — 90471 IMMUNIZATION ADMIN: CPT | Performed by: OBSTETRICS & GYNECOLOGY

## 2022-04-27 NOTE — NURSING NOTE
Prior to immunization administration, verified patients identity using patient s name and date of birth. Please see Immunization Activity for additional information.     Screening Questionnaire for Adult Immunization    Are you sick today?   No   Do you have allergies to medications, food, a vaccine component or latex?   No   Have you ever had a serious reaction after receiving a vaccination?   No   Do you have a long-term health problem with heart, lung, kidney, or metabolic disease (e.g., diabetes), asthma, a blood disorder, no spleen, complement component deficiency, a cochlear implant, or a spinal fluid leak?  Are you on long-term aspirin therapy?   No   Do you have cancer, leukemia, HIV/AIDS, or any other immune system problem?   No   Do you have a parent, brother, or sister with an immune system problem?   No   In the past 3 months, have you taken medications that affect  your immune system, such as prednisone, other steroids, or anticancer drugs; drugs for the treatment of rheumatoid arthritis, Crohn s disease, or psoriasis; or have you had radiation treatments?   No   Have you had a seizure, or a brain or other nervous system problem?   No   During the past year, have you received a transfusion of blood or blood    products, or been given immune (gamma) globulin or antiviral drug?   No   For women: Are you pregnant or is there a chance you could become       pregnant during the next month?   yes   Have you received any vaccinations in the past 4 weeks?   No     Immunization questionnaire answers were all negative.        Per orders of Dr. Sr, injection of Tdap given by Jovana Antonio CMA. Patient instructed to remain in clinic for 15 minutes afterwards, and to report any adverse reaction to me immediately.       Screening performed by Jovana Antonio CMA on 4/27/2022 at 3:01 PM.

## 2022-04-27 NOTE — LETTER
April 27, 2022      RE: Anh Webbero  [address]  [facility] [address]       To whom it may concern:    Anh Jean Carlos Roberts was seen in our clinic today for OB appointment    Sincerely,      Tawanna Sr MD

## 2022-04-27 NOTE — PROGRESS NOTES
Doing well.  No concerns today.  1 hour GTT done today.  Prenatal flowsheet information is reviewed.  Discussed PTL, PROM, and when to call or come in.  Discussed kick counts and fetal movement.  Reportable signs and symptoms discussed.  Growth US today.  Reviewed location of fibroid.  Discussed that not all fibroids prevent vaginal delivery.  We will need to monitor fetal presentation and whether head is able to descend.  Plan to discuss more at next visit.    Results for orders placed or performed in visit on 04/27/22   US OB Follow Up >14 Weeks     Status: None    Narrative    US OB Follow Up >14 Weeks  Order #: [id number] Accession #: [id number]      Study Notes       Genesis Lopez on 4/27/2022 10:51 AM   a  Obstetrical Ultrasound Report  OB U/S Follow Up > 14 Weeks - Transabdominal  [facility]  Referring physician: Dr. Tawanna Sr  Sonographer: Genesis Lopez RDMS  Indication:  F/U Growth     Dating (mm/dd/yyyy):   LMP: Patient's last menstrual period was 10/12/2021 (exact date).        EDC:  Estimated Date of Delivery: Jul 19, 2022   GA by LMP:                28w1d  Current Scan On (mm/dd/yyyy):  4/27/2022                       EDC:   07/23/22             GA by Current   Scan:      27w4d  The calculation of the gestational age by current scan was based on BPD,   HC, AC and FL.     Anatomy Scan:  Hair gestation.  Visualized: 4 Chamber Heart, Stomach, Kidneys, and Bladder.  Biometry:  BPD 6.36 cm 25w5d <2.3%   HC 25.64 cm 27w6d 13.5%   AC 24.64 cm 28w6d 65.5%   FL 5.15 cm 27w4d 18.9%   EFW (lbs/oz) 2 lbs               9ozs       EFW (g) 1176 g 36.1%        Fetal heart rate: 134 bpm  Fetal presentation: Transverse  Amniotic fluid: 4.96cm MVP  Placenta: Anterior , no previa, > 2 cm from internal os  Maternal Anatomy:  Right adnexa: wnl  Left adnexa: wnl  Other: Right PENG fibroid 12.8x 10.2x 9.1cm, fibroid 2.3 cm from cervix.  Impression:             Growth and anatomy survey appears  normal.  Fetal anomalies may be present but not dectected.  Growth is appropriate for gestational age.  EFW by today's ultrasound is 1176 grams, which is the 36%tile.  Normal MVP, transverse presentation.  Placental location is anterior.  No previa or low lying placenta   visualized.  Plan repeat growth in 4 weeks.    Tawanna Sr MD

## 2022-05-09 ENCOUNTER — PRENATAL OFFICE VISIT (OUTPATIENT)
Dept: OBGYN | Facility: CLINIC | Age: 42
End: 2022-05-09
Payer: COMMERCIAL

## 2022-05-09 ENCOUNTER — TELEPHONE (OUTPATIENT)
Dept: OBGYN | Facility: CLINIC | Age: 42
End: 2022-05-09

## 2022-05-09 VITALS — BODY MASS INDEX: 28.12 KG/M2 | DIASTOLIC BLOOD PRESSURE: 62 MMHG | SYSTOLIC BLOOD PRESSURE: 110 MMHG | WEIGHT: 144 LBS

## 2022-05-09 DIAGNOSIS — D25.9 LEIOMYOMA OF UTERUS AFFECTING PREGNANCY IN THIRD TRIMESTER: ICD-10-CM

## 2022-05-09 DIAGNOSIS — O34.13 LEIOMYOMA OF UTERUS AFFECTING PREGNANCY IN THIRD TRIMESTER: ICD-10-CM

## 2022-05-09 DIAGNOSIS — O09.93 SUPERVISION OF HIGH RISK PREGNANCY IN THIRD TRIMESTER: Primary | ICD-10-CM

## 2022-05-09 DIAGNOSIS — Z30.09 CONSULTATION FOR FEMALE STERILIZATION: ICD-10-CM

## 2022-05-09 DIAGNOSIS — Z3A.29 29 WEEKS GESTATION OF PREGNANCY: ICD-10-CM

## 2022-05-09 PROCEDURE — 99207 PR PRENATAL VISIT: CPT | Performed by: OBSTETRICS & GYNECOLOGY

## 2022-05-09 NOTE — TELEPHONE ENCOUNTER
ERAS BAG GIVEN Not yet  ERAS INSTRUCTIONS EXPLAINED N/A    ASSIST: s or Andrei    H&P TO BE COMPLETED BY:   Surgeon  FOR H&P TO BE DONE BY SURGEON   UPDATE VISIT ON DATE AT HOSPITAL  SAME DAY/OBSERVATION/INPATIENT: ADMIT  EQUIPMENT: n/a  VENDOR NEEDED AT CASE: n/a  IF IUD WHAT BRAND n/a  LABS/SPECIAL INSTRUCTIONS: CBC, Type and Screen  TIME OFF WORK: 8 weeks  ESTIMATED DOCTOR TIME NEEDED IN MINUTES 60  POST OP TO BE SCHEDULED AT 6 WEEKS AFTER

## 2022-05-09 NOTE — PROGRESS NOTES
Doing well.  No concerns today.  Prenatal flowsheet information is reviewed.  Discussed PTL, PROM, and when to call or come in.  Reportable signs and symptoms discussed.  Discussed primary  due to location of fibroid.  Patient is in agreement to schedule, will re-evaluate closer to due date to see if head descends below fibroid.  Patient desires tubal ligation if  performed.  Federal consent signed today.

## 2022-05-10 NOTE — TELEPHONE ENCOUNTER
Type of surgery: PRIMARY CS  Location of surgery: [facility] OR  Date and time of surgery: 7/12/2022 11a  Surgeon: DEBRA Cuba  Pre-Op Appt Date: HOSPITAL  Post-Op Appt Date: 8/23/2022 11:30a   Packet sent out: MAILED 5/10/2022  Pre-cert/Authorization completed:  TBD  Date: 5/9/2022 Marcia MCKEON TEST 7/9/2022 11:30a DAYSI Ramires  Surgery Scheduler

## 2022-05-10 NOTE — TELEPHONE ENCOUNTER
Spoke to Maya sandres [facility] for changes below    Marie Ramires  Surgery Scheduler

## 2022-05-10 NOTE — TELEPHONE ENCOUNTER
Attempted call to patient but VM is full.  Mailed SX packet    Marie Ramires  Surgery Scheduler

## 2022-05-10 NOTE — TELEPHONE ENCOUNTER
Dr Sr sent a case modification as below.  Msg sent to Dr Sr to resend modification as ligation is spelled incorrectly    Marie Ramires  Surgery Scheduler

## 2022-05-12 NOTE — TELEPHONE ENCOUNTER
VM FULL CAN NOT LEAVE MSG RE: CS APPOINTMENTS  LEFT NOTE ON NEXT APPNT TO SEE ME    Marie Ramires  Surgery Scheduler

## 2022-05-25 NOTE — TELEPHONE ENCOUNTER
Gave CS information to Dr Sr so she can go over it at next appointment as pt does not answer phone and vm is full    Marie Ramires  Surgery Scheduler

## 2022-05-26 ENCOUNTER — APPOINTMENT (OUTPATIENT)
Dept: INTERPRETER SERVICES | Facility: CLINIC | Age: 42
End: 2022-05-26
Payer: COMMERCIAL

## 2022-05-31 ENCOUNTER — ANCILLARY PROCEDURE (OUTPATIENT)
Dept: ULTRASOUND IMAGING | Facility: CLINIC | Age: 42
End: 2022-05-31
Attending: OBSTETRICS & GYNECOLOGY
Payer: COMMERCIAL

## 2022-05-31 ENCOUNTER — PRENATAL OFFICE VISIT (OUTPATIENT)
Dept: OBGYN | Facility: CLINIC | Age: 42
End: 2022-05-31
Payer: COMMERCIAL

## 2022-05-31 VITALS — SYSTOLIC BLOOD PRESSURE: 114 MMHG | BODY MASS INDEX: 28.47 KG/M2 | DIASTOLIC BLOOD PRESSURE: 62 MMHG | WEIGHT: 145.8 LBS

## 2022-05-31 DIAGNOSIS — O09.93 SUPERVISION OF HIGH RISK PREGNANCY IN THIRD TRIMESTER: Primary | ICD-10-CM

## 2022-05-31 DIAGNOSIS — Z20.822 SUSPECTED COVID-19 VIRUS INFECTION: ICD-10-CM

## 2022-05-31 DIAGNOSIS — Z3A.33 33 WEEKS GESTATION OF PREGNANCY: ICD-10-CM

## 2022-05-31 DIAGNOSIS — O09.93 SUPERVISION OF HIGH RISK PREGNANCY IN THIRD TRIMESTER: ICD-10-CM

## 2022-05-31 PROCEDURE — U0005 INFEC AGEN DETEC AMPLI PROBE: HCPCS | Performed by: OBSTETRICS & GYNECOLOGY

## 2022-05-31 PROCEDURE — 76816 OB US FOLLOW-UP PER FETUS: CPT

## 2022-05-31 PROCEDURE — U0003 INFECTIOUS AGENT DETECTION BY NUCLEIC ACID (DNA OR RNA); SEVERE ACUTE RESPIRATORY SYNDROME CORONAVIRUS 2 (SARS-COV-2) (CORONAVIRUS DISEASE [COVID-19]), AMPLIFIED PROBE TECHNIQUE, MAKING USE OF HIGH THROUGHPUT TECHNOLOGIES AS DESCRIBED BY CMS-2020-01-R: HCPCS | Performed by: OBSTETRICS & GYNECOLOGY

## 2022-05-31 PROCEDURE — 99207 PR PRENATAL VISIT: CPT | Performed by: OBSTETRICS & GYNECOLOGY

## 2022-05-31 NOTE — LETTER
[facility]  [address] [place] [address] [address]  [facility] [address]  [phone]    May 31, 2022    Re: Anh Roberts  [address]  [facility] [address]  [phone] (home)     : 1980      To Whom It May Concern:      Anh Roberts was seen in office today for Ultrasound and prenatal appointment.    Sincerely,        Tawanna Sr MD

## 2022-06-01 LAB — SARS-COV-2 RNA RESP QL NAA+PROBE: NEGATIVE

## 2022-06-01 NOTE — PROGRESS NOTES
Doing well.  No concerns today.  Transverse position confirmed by ultrasound.  Prenatal flowsheet information is reviewed.  Discussed PTL, PROM, and when to call or come in.  Discussed kick counts and fetal movement.  Reportable signs and symptoms discussed.  Reviewed US from today.  Discussed at this point will still need  given presentation.    Additionally patient has had 5 days of cough and sore throat.  She states her son has also been sick and tested negative for COVID.  She has not been tested.  She denies fever.  Reviewed safe OTC medications to help with cough  COVID swab today.

## 2022-06-14 ENCOUNTER — PRENATAL OFFICE VISIT (OUTPATIENT)
Dept: OBGYN | Facility: CLINIC | Age: 42
End: 2022-06-14
Payer: COMMERCIAL

## 2022-06-14 VITALS — SYSTOLIC BLOOD PRESSURE: 110 MMHG | DIASTOLIC BLOOD PRESSURE: 60 MMHG | WEIGHT: 148 LBS | BODY MASS INDEX: 28.9 KG/M2

## 2022-06-14 DIAGNOSIS — D25.9 LEIOMYOMA OF UTERUS AFFECTING PREGNANCY IN SECOND TRIMESTER: ICD-10-CM

## 2022-06-14 DIAGNOSIS — Z3A.35 35 WEEKS GESTATION OF PREGNANCY: ICD-10-CM

## 2022-06-14 DIAGNOSIS — O09.522 AMA (ADVANCED MATERNAL AGE) MULTIGRAVIDA 35+, SECOND TRIMESTER: Primary | ICD-10-CM

## 2022-06-14 DIAGNOSIS — O09.92 SUPERVISION OF HIGH RISK PREGNANCY IN SECOND TRIMESTER: ICD-10-CM

## 2022-06-14 DIAGNOSIS — O34.12 LEIOMYOMA OF UTERUS AFFECTING PREGNANCY IN SECOND TRIMESTER: ICD-10-CM

## 2022-06-14 PROCEDURE — 99207 PR PRENATAL VISIT: CPT | Performed by: OBSTETRICS & GYNECOLOGY

## 2022-06-15 NOTE — PROGRESS NOTES
Doing well.  No concerns today.  Prenatal flowsheet information is reviewed.  Discussed signs of labor and when to call or come in.  C-sections discussed with the patient.  Discussed kick counts and fetal movement.  Biophysical profile and growth US ordered for next visit  Reportable signs and symptoms discussed.  Follow-up in 1 week

## 2022-06-17 DIAGNOSIS — O09.522 AMA (ADVANCED MATERNAL AGE) MULTIGRAVIDA 35+, SECOND TRIMESTER: Primary | ICD-10-CM

## 2022-06-17 DIAGNOSIS — O09.93 SUPERVISION OF HIGH RISK PREGNANCY IN THIRD TRIMESTER: ICD-10-CM

## 2022-06-17 NOTE — PROGRESS NOTES
6/14/22 OV notes:  Progress Notes  Tawanna Sr MD (Physician)     OB/Gyn  Doing well.  No concerns today.  Prenatal flowsheet information is reviewed.  Discussed signs of labor and when to call or come in.  C-sections discussed with the patient.  Discussed kick counts and fetal movement.  Biophysical profile and growth US ordered for next visit  Reportable signs and symptoms discussed.  Follow-up in 1 week          Ordered BPP w growth US    Cady Azevedo RN on 6/17/2022 at 3:32 PM

## 2022-06-20 ENCOUNTER — ANCILLARY PROCEDURE (OUTPATIENT)
Dept: ULTRASOUND IMAGING | Facility: CLINIC | Age: 42
End: 2022-06-20
Payer: COMMERCIAL

## 2022-06-20 ENCOUNTER — PRENATAL OFFICE VISIT (OUTPATIENT)
Dept: OBGYN | Facility: CLINIC | Age: 42
End: 2022-06-20
Payer: COMMERCIAL

## 2022-06-20 VITALS — DIASTOLIC BLOOD PRESSURE: 62 MMHG | BODY MASS INDEX: 28.9 KG/M2 | SYSTOLIC BLOOD PRESSURE: 108 MMHG | WEIGHT: 148 LBS

## 2022-06-20 DIAGNOSIS — Z75.8 TELEPHONE LANGUAGE INTERPRETER SERVICE REQUIRED: ICD-10-CM

## 2022-06-20 DIAGNOSIS — O99.012 ANEMIA AFFECTING PREGNANCY IN SECOND TRIMESTER: ICD-10-CM

## 2022-06-20 DIAGNOSIS — O09.93 SUPERVISION OF HIGH RISK PREGNANCY IN THIRD TRIMESTER: Primary | ICD-10-CM

## 2022-06-20 DIAGNOSIS — D25.9 LEIOMYOMA OF UTERUS AFFECTING PREGNANCY IN SECOND TRIMESTER: ICD-10-CM

## 2022-06-20 DIAGNOSIS — O09.522 AMA (ADVANCED MATERNAL AGE) MULTIGRAVIDA 35+, SECOND TRIMESTER: ICD-10-CM

## 2022-06-20 DIAGNOSIS — O34.12 LEIOMYOMA OF UTERUS AFFECTING PREGNANCY IN SECOND TRIMESTER: ICD-10-CM

## 2022-06-20 DIAGNOSIS — O09.93 SUPERVISION OF HIGH RISK PREGNANCY IN THIRD TRIMESTER: ICD-10-CM

## 2022-06-20 LAB — HGB BLD-MCNC: 12.2 G/DL (ref 11.7–15.7)

## 2022-06-20 PROCEDURE — 99207 PR PRENATAL VISIT: CPT | Performed by: STUDENT IN AN ORGANIZED HEALTH CARE EDUCATION/TRAINING PROGRAM

## 2022-06-20 PROCEDURE — 76819 FETAL BIOPHYS PROFIL W/O NST: CPT

## 2022-06-20 PROCEDURE — 36415 COLL VENOUS BLD VENIPUNCTURE: CPT | Performed by: STUDENT IN AN ORGANIZED HEALTH CARE EDUCATION/TRAINING PROGRAM

## 2022-06-20 PROCEDURE — 76816 OB US FOLLOW-UP PER FETUS: CPT

## 2022-06-20 PROCEDURE — 87653 STREP B DNA AMP PROBE: CPT | Performed by: STUDENT IN AN ORGANIZED HEALTH CARE EDUCATION/TRAINING PROGRAM

## 2022-06-20 PROCEDURE — 87086 URINE CULTURE/COLONY COUNT: CPT | Performed by: STUDENT IN AN ORGANIZED HEALTH CARE EDUCATION/TRAINING PROGRAM

## 2022-06-20 PROCEDURE — 85018 HEMOGLOBIN: CPT | Performed by: STUDENT IN AN ORGANIZED HEALTH CARE EDUCATION/TRAINING PROGRAM

## 2022-06-20 NOTE — PROGRESS NOTES
Seen with iPad .   Anh Roberts is a 41 year old  at 35w6d by 10w2d US presenting for routine prenatal care.   Prenatal Care (35w6d)  She is doing well. Denies LOF, VB, ctx. +FM. +nausea with TID Fe tablets.     Conditions affecting pregnancy:  - Breech  - Fibroid uterus -13.1cm right sided  - AMA  - 1st trimester anemia  - UTI in 1st tri     Objective- see flow sheet  Vitals:    22 1447   BP: 108/62   Weight: 67.1 kg (148 lb)       Anh Roberts is a 41 year old  at 35w6d presenting for routine ob visit.       ICD-10-CM    1. Supervision of high risk pregnancy in third trimester  O09.93 Urine Culture     Group B strep PCR     Urine Culture   2. Breech presentation, single or unspecified fetus  O32.1XX0    3. Leiomyoma of uterus affecting pregnancy in second trimester  O34.12     D25.9    4. Anemia affecting pregnancy in second trimester  O99.012 Hemoglobin     Hemoglobin   5. AMA (advanced maternal age) multigravida 35+, second trimester  O09.522    6. Telephone  service required  Z78.9      - Breech- noted on BPP today. Has CS scheduled for large obstructing fibroid uterus. Discussed labor return precautions.   - Leiomyoma- large right sided fibroid, 13.1cm. 2.3cm from cervix. Concern for obstruction. S/p MFM consult. Primary CS+BTL is scheduled . Growth US at 33w: EFW 56%ile, today 44%ile   - AMA: BPP today . Weekly until delivery.   - Anemia: 1st tri hg 8.8, ferritin wnl, ELP wnl, normal peripheral smear, normal FOBT. Repeat 3rd tri hgb 12.4. Repeat today due to low 1st tri hgb. If normal, can decrease Fe to once daily.   - First tri labs: E. Feaecalis UTI, hgb 8.8. NIPT abnormal- likely due to fibroids. Had w/u for malignancy and consult with MFM. Anatomy US wnl. S/p COVID-19 vaccine, TDAP. Normal 3rd tri labs. GBS collected today.    - Repeat hgb, UCx today.   - Discussed routine precautions  - TWlbs. Pregravid BMI 24. Expect  25-35lbs.    Return weekly for PNC.     Alix Go MD, MHS  06/20/22

## 2022-06-21 LAB — GP B STREP DNA SPEC QL NAA+PROBE: NEGATIVE

## 2022-06-22 LAB — BACTERIA UR CULT: NORMAL

## 2022-06-23 ENCOUNTER — HOSPITAL ENCOUNTER (OUTPATIENT)
Facility: CLINIC | Age: 42
End: 2022-06-23
Attending: OBSTETRICS & GYNECOLOGY | Admitting: OBSTETRICS & GYNECOLOGY
Payer: COMMERCIAL

## 2022-06-23 DIAGNOSIS — O09.93 SUPERVISION OF HIGH RISK PREGNANCY IN THIRD TRIMESTER: Primary | ICD-10-CM

## 2022-06-28 ENCOUNTER — PRENATAL OFFICE VISIT (OUTPATIENT)
Dept: OBGYN | Facility: CLINIC | Age: 42
End: 2022-06-28
Payer: COMMERCIAL

## 2022-06-28 VITALS — WEIGHT: 151 LBS | SYSTOLIC BLOOD PRESSURE: 116 MMHG | BODY MASS INDEX: 29.49 KG/M2 | DIASTOLIC BLOOD PRESSURE: 66 MMHG

## 2022-06-28 DIAGNOSIS — D25.9 LEIOMYOMA OF UTERUS AFFECTING PREGNANCY IN SECOND TRIMESTER: ICD-10-CM

## 2022-06-28 DIAGNOSIS — O34.12 LEIOMYOMA OF UTERUS AFFECTING PREGNANCY IN SECOND TRIMESTER: ICD-10-CM

## 2022-06-28 DIAGNOSIS — O09.93 SUPERVISION OF HIGH RISK PREGNANCY IN THIRD TRIMESTER: Primary | ICD-10-CM

## 2022-06-28 PROCEDURE — 99207 PR PRENATAL VISIT: CPT | Performed by: STUDENT IN AN ORGANIZED HEALTH CARE EDUCATION/TRAINING PROGRAM

## 2022-06-28 PROCEDURE — 59425 ANTEPARTUM CARE ONLY: CPT | Performed by: STUDENT IN AN ORGANIZED HEALTH CARE EDUCATION/TRAINING PROGRAM

## 2022-06-28 NOTE — PROGRESS NOTES
Seen with iPad .     Anh Roberts is a 41 year old  at 37w0d by 10w2d US presenting for routine prenatal care.   She is doing well. Some hip discomfort, pelvic discomfort. Denies LOF, VB, ctx. +FM.    Conditions affecting pregnancy:  - Fibroid uterus -13.1cm right sided  - AMA  - Desires permanent sterilizaiton    Objective- see flow sheet  Vitals:    22 1607   BP: 116/66   Weight: 68.5 kg (151 lb)       Anh Roberts is a 41 year old  at 37w0d presenting for routine ob visit.       ICD-10-CM    1. Supervision of high risk pregnancy in third trimester  O09.93    2. Leiomyoma of uterus affecting pregnancy in second trimester  O34.12     D25.9      - Leiomyoma- large right sided fibroid, 13.1cm. 2.3cm from cervix. Concern for obstruction. S/p MFM consult. Primary CS+BTL is scheduled . Growth US: EFW 56%ile @33w>44%ile @35w4d  - AMA: BPP today . Weekly until delivery.   - Anemia: 1st tri hg 8.8, ferritin wnl, ELP wnl, normal peripheral smear, normal FOBT. Repeat 3rd tri hgb 12.2. Repeat 12.2. PO Fe daily.   - First tri labs: E. Feaecalis UTI, repeat neg, hgb 8.8, 3rd tri repeat 12.2. NIPT abnormal- likely due to fibroids. Had w/u for malignancy and consult with MFM. Anatomy US wnl. S/p COVID-19 vaccine, TDAP. Normal 3rd tri labs. GBS negative.  - Discussed routine precautions  - TWlbs. Pregravid BMI 24. Expect 25-35lbs.  - Quesitons about time off work. Would like to take off starting a few days before CS. Instructed to ask employer if documentation is needed and provide us with fax # at next visit, or we can provide a letter for her to take to employer.     Return weekly for PNC.     Alix Go MD, MHS  22

## 2022-07-05 ENCOUNTER — HOSPITAL ENCOUNTER (INPATIENT)
Facility: CLINIC | Age: 42
LOS: 3 days | Discharge: HOME | End: 2022-07-08
Attending: STUDENT IN AN ORGANIZED HEALTH CARE EDUCATION/TRAINING PROGRAM | Admitting: STUDENT IN AN ORGANIZED HEALTH CARE EDUCATION/TRAINING PROGRAM
Payer: COMMERCIAL

## 2022-07-05 ENCOUNTER — ANESTHESIA (OUTPATIENT)
Dept: OBGYN | Facility: CLINIC | Age: 42
End: 2022-07-05
Payer: COMMERCIAL

## 2022-07-05 ENCOUNTER — ANESTHESIA EVENT (OUTPATIENT)
Dept: OBGYN | Facility: CLINIC | Age: 42
End: 2022-07-05
Payer: COMMERCIAL

## 2022-07-05 DIAGNOSIS — Z98.891 S/P PRIMARY LOW TRANSVERSE C-SECTION: ICD-10-CM

## 2022-07-05 PROBLEM — D25.1 INTRAMURAL LEIOMYOMA OF UTERUS: Chronic | Status: ACTIVE | Noted: 2022-07-05

## 2022-07-05 PROBLEM — O14.13 PREECLAMPSIA, SEVERE, THIRD TRIMESTER: Status: ACTIVE | Noted: 2022-07-05

## 2022-07-05 LAB
ABO/RH(D): NORMAL
ALBUMIN SERPL-MCNC: 2.7 G/DL (ref 3.4–5)
ALP SERPL-CCNC: 119 U/L (ref 40–150)
ALT SERPL W P-5'-P-CCNC: 339 U/L (ref 0–50)
ALT SERPL W P-5'-P-CCNC: 64 U/L (ref 0–50)
ANION GAP SERPL CALCULATED.3IONS-SCNC: 9 MMOL/L (ref 3–14)
ANTIBODY SCREEN: NEGATIVE
APTT PPP: 29 SECONDS (ref 22–38)
AST SERPL W P-5'-P-CCNC: 501 U/L (ref 0–45)
AST SERPL W P-5'-P-CCNC: 82 U/L (ref 0–45)
BILIRUB SERPL-MCNC: 0.4 MG/DL (ref 0.2–1.3)
BUN SERPL-MCNC: 9 MG/DL (ref 7–30)
CALCIUM SERPL-MCNC: 10 MG/DL (ref 8.5–10.1)
CHLORIDE BLD-SCNC: 109 MMOL/L (ref 94–109)
CO2 SERPL-SCNC: 19 MMOL/L (ref 20–32)
CREAT SERPL-MCNC: 0.54 MG/DL (ref 0.52–1.04)
CREAT SERPL-MCNC: 0.61 MG/DL (ref 0.52–1.04)
ERYTHROCYTE [DISTWIDTH] IN BLOOD BY AUTOMATED COUNT: 14 % (ref 10–15)
ERYTHROCYTE [DISTWIDTH] IN BLOOD BY AUTOMATED COUNT: 14.4 % (ref 10–15)
FIBRINOGEN PPP-MCNC: 407 MG/DL (ref 170–490)
GFR SERPL CREATININE-BSD FRML MDRD: >90 ML/MIN/1.73M2
GFR SERPL CREATININE-BSD FRML MDRD: >90 ML/MIN/1.73M2
GLUCOSE BLD-MCNC: 102 MG/DL (ref 70–99)
GLUCOSE BLDC GLUCOMTR-MCNC: 112 MG/DL (ref 70–99)
HCT VFR BLD AUTO: 32.2 % (ref 35–47)
HCT VFR BLD AUTO: 42.9 % (ref 35–47)
HGB BLD-MCNC: 10.7 G/DL (ref 11.7–15.7)
HGB BLD-MCNC: 14.1 G/DL (ref 11.7–15.7)
INR PPP: 0.88 (ref 0.85–1.15)
LIPASE SERPL-CCNC: 121 U/L (ref 73–393)
MCH RBC QN AUTO: 32 PG (ref 26.5–33)
MCH RBC QN AUTO: 32.1 PG (ref 26.5–33)
MCHC RBC AUTO-ENTMCNC: 32.9 G/DL (ref 31.5–36.5)
MCHC RBC AUTO-ENTMCNC: 33.2 G/DL (ref 31.5–36.5)
MCV RBC AUTO: 97 FL (ref 78–100)
MCV RBC AUTO: 97 FL (ref 78–100)
PLAT MORPH BLD: NORMAL
PLATELET # BLD AUTO: 152 10E3/UL (ref 150–450)
PLATELET # BLD AUTO: 48 10E3/UL (ref 150–450)
POTASSIUM BLD-SCNC: 3.8 MMOL/L (ref 3.4–5.3)
PROT SERPL-MCNC: 6.9 G/DL (ref 6.8–8.8)
RBC # BLD AUTO: 3.33 10E6/UL (ref 3.8–5.2)
RBC # BLD AUTO: 4.41 10E6/UL (ref 3.8–5.2)
RBC MORPH BLD: NORMAL
SARS-COV-2 RNA RESP QL NAA+PROBE: NEGATIVE
SODIUM SERPL-SCNC: 137 MMOL/L (ref 133–144)
SPECIMEN EXPIRATION DATE: NORMAL
URATE SERPL-MCNC: 4.3 MG/DL (ref 2.6–6)
WBC # BLD AUTO: 12.1 10E3/UL (ref 4–11)
WBC # BLD AUTO: 7.5 10E3/UL (ref 4–11)

## 2022-07-05 PROCEDURE — 85027 COMPLETE CBC AUTOMATED: CPT | Performed by: STUDENT IN AN ORGANIZED HEALTH CARE EDUCATION/TRAINING PROGRAM

## 2022-07-05 PROCEDURE — 58611 LIGATE OVIDUCT(S) ADD-ON: CPT | Performed by: STUDENT IN AN ORGANIZED HEALTH CARE EDUCATION/TRAINING PROGRAM

## 2022-07-05 PROCEDURE — 82962 GLUCOSE BLOOD TEST: CPT

## 2022-07-05 PROCEDURE — 272N000001 HC OR GENERAL SUPPLY STERILE: Performed by: STUDENT IN AN ORGANIZED HEALTH CARE EDUCATION/TRAINING PROGRAM

## 2022-07-05 PROCEDURE — 0UT70ZZ RESECTION OF BILATERAL FALLOPIAN TUBES, OPEN APPROACH: ICD-10-PCS | Performed by: STUDENT IN AN ORGANIZED HEALTH CARE EDUCATION/TRAINING PROGRAM

## 2022-07-05 PROCEDURE — 250N000013 HC RX MED GY IP 250 OP 250 PS 637: Performed by: STUDENT IN AN ORGANIZED HEALTH CARE EDUCATION/TRAINING PROGRAM

## 2022-07-05 PROCEDURE — 82565 ASSAY OF CREATININE: CPT | Performed by: STUDENT IN AN ORGANIZED HEALTH CARE EDUCATION/TRAINING PROGRAM

## 2022-07-05 PROCEDURE — 250N000009 HC RX 250: Performed by: NURSE ANESTHETIST, CERTIFIED REGISTERED

## 2022-07-05 PROCEDURE — 120N000012 HC R&B POSTPARTUM

## 2022-07-05 PROCEDURE — 84550 ASSAY OF BLOOD/URIC ACID: CPT | Performed by: STUDENT IN AN ORGANIZED HEALTH CARE EDUCATION/TRAINING PROGRAM

## 2022-07-05 PROCEDURE — 250N000011 HC RX IP 250 OP 636: Performed by: NURSE ANESTHETIST, CERTIFIED REGISTERED

## 2022-07-05 PROCEDURE — 59515 CESAREAN DELIVERY: CPT | Performed by: STUDENT IN AN ORGANIZED HEALTH CARE EDUCATION/TRAINING PROGRAM

## 2022-07-05 PROCEDURE — 59514 CESAREAN DELIVERY ONLY: CPT | Mod: 80 | Performed by: OBSTETRICS & GYNECOLOGY

## 2022-07-05 PROCEDURE — 88307 TISSUE EXAM BY PATHOLOGIST: CPT | Mod: 26 | Performed by: PATHOLOGY

## 2022-07-05 PROCEDURE — 58611 LIGATE OVIDUCT(S) ADD-ON: CPT | Mod: 80 | Performed by: OBSTETRICS & GYNECOLOGY

## 2022-07-05 PROCEDURE — C1765 ADHESION BARRIER: HCPCS | Performed by: STUDENT IN AN ORGANIZED HEALTH CARE EDUCATION/TRAINING PROGRAM

## 2022-07-05 PROCEDURE — 250N000013 HC RX MED GY IP 250 OP 250 PS 637

## 2022-07-05 PROCEDURE — 250N000011 HC RX IP 250 OP 636: Performed by: STUDENT IN AN ORGANIZED HEALTH CARE EDUCATION/TRAINING PROGRAM

## 2022-07-05 PROCEDURE — 250N000013 HC RX MED GY IP 250 OP 250 PS 637: Performed by: OBSTETRICS & GYNECOLOGY

## 2022-07-05 PROCEDURE — 84450 TRANSFERASE (AST) (SGOT): CPT | Performed by: STUDENT IN AN ORGANIZED HEALTH CARE EDUCATION/TRAINING PROGRAM

## 2022-07-05 PROCEDURE — 710N000009 HC RECOVERY PHASE 1, LEVEL 1, PER MIN: Performed by: STUDENT IN AN ORGANIZED HEALTH CARE EDUCATION/TRAINING PROGRAM

## 2022-07-05 PROCEDURE — 83690 ASSAY OF LIPASE: CPT | Performed by: STUDENT IN AN ORGANIZED HEALTH CARE EDUCATION/TRAINING PROGRAM

## 2022-07-05 PROCEDURE — 84460 ALANINE AMINO (ALT) (SGPT): CPT | Performed by: STUDENT IN AN ORGANIZED HEALTH CARE EDUCATION/TRAINING PROGRAM

## 2022-07-05 PROCEDURE — 360N000076 HC SURGERY LEVEL 3, PER MIN: Performed by: STUDENT IN AN ORGANIZED HEALTH CARE EDUCATION/TRAINING PROGRAM

## 2022-07-05 PROCEDURE — 250N000011 HC RX IP 250 OP 636: Performed by: ANESTHESIOLOGY

## 2022-07-05 PROCEDURE — 250N000011 HC RX IP 250 OP 636: Performed by: OBSTETRICS & GYNECOLOGY

## 2022-07-05 PROCEDURE — 36415 COLL VENOUS BLD VENIPUNCTURE: CPT | Performed by: STUDENT IN AN ORGANIZED HEALTH CARE EDUCATION/TRAINING PROGRAM

## 2022-07-05 PROCEDURE — 85384 FIBRINOGEN ACTIVITY: CPT | Performed by: STUDENT IN AN ORGANIZED HEALTH CARE EDUCATION/TRAINING PROGRAM

## 2022-07-05 PROCEDURE — P9041 ALBUMIN (HUMAN),5%, 50ML: HCPCS | Performed by: NURSE ANESTHETIST, CERTIFIED REGISTERED

## 2022-07-05 PROCEDURE — 85730 THROMBOPLASTIN TIME PARTIAL: CPT | Performed by: STUDENT IN AN ORGANIZED HEALTH CARE EDUCATION/TRAINING PROGRAM

## 2022-07-05 PROCEDURE — 250N000011 HC RX IP 250 OP 636

## 2022-07-05 PROCEDURE — 88307 TISSUE EXAM BY PATHOLOGIST: CPT | Mod: TC | Performed by: STUDENT IN AN ORGANIZED HEALTH CARE EDUCATION/TRAINING PROGRAM

## 2022-07-05 PROCEDURE — 88302 TISSUE EXAM BY PATHOLOGIST: CPT | Mod: TC | Performed by: STUDENT IN AN ORGANIZED HEALTH CARE EDUCATION/TRAINING PROGRAM

## 2022-07-05 PROCEDURE — 85610 PROTHROMBIN TIME: CPT | Performed by: STUDENT IN AN ORGANIZED HEALTH CARE EDUCATION/TRAINING PROGRAM

## 2022-07-05 PROCEDURE — 370N000017 HC ANESTHESIA TECHNICAL FEE, PER MIN: Performed by: STUDENT IN AN ORGANIZED HEALTH CARE EDUCATION/TRAINING PROGRAM

## 2022-07-05 PROCEDURE — 258N000003 HC RX IP 258 OP 636: Performed by: NURSE ANESTHETIST, CERTIFIED REGISTERED

## 2022-07-05 PROCEDURE — 258N000003 HC RX IP 258 OP 636: Performed by: STUDENT IN AN ORGANIZED HEALTH CARE EDUCATION/TRAINING PROGRAM

## 2022-07-05 PROCEDURE — 80053 COMPREHEN METABOLIC PANEL: CPT | Performed by: STUDENT IN AN ORGANIZED HEALTH CARE EDUCATION/TRAINING PROGRAM

## 2022-07-05 PROCEDURE — G0463 HOSPITAL OUTPT CLINIC VISIT: HCPCS

## 2022-07-05 PROCEDURE — U0003 INFECTIOUS AGENT DETECTION BY NUCLEIC ACID (DNA OR RNA); SEVERE ACUTE RESPIRATORY SYNDROME CORONAVIRUS 2 (SARS-COV-2) (CORONAVIRUS DISEASE [COVID-19]), AMPLIFIED PROBE TECHNIQUE, MAKING USE OF HIGH THROUGHPUT TECHNOLOGIES AS DESCRIBED BY CMS-2020-01-R: HCPCS | Performed by: STUDENT IN AN ORGANIZED HEALTH CARE EDUCATION/TRAINING PROGRAM

## 2022-07-05 PROCEDURE — 88302 TISSUE EXAM BY PATHOLOGIST: CPT | Mod: 26 | Performed by: PATHOLOGY

## 2022-07-05 PROCEDURE — 86901 BLOOD TYPING SEROLOGIC RH(D): CPT | Performed by: STUDENT IN AN ORGANIZED HEALTH CARE EDUCATION/TRAINING PROGRAM

## 2022-07-05 RX ORDER — LABETALOL HYDROCHLORIDE 5 MG/ML
20-80 INJECTION, SOLUTION INTRAVENOUS EVERY 10 MIN PRN
Status: DISCONTINUED | OUTPATIENT
Start: 2022-07-05 | End: 2022-07-08 | Stop reason: HOSPADM

## 2022-07-05 RX ORDER — ONDANSETRON 2 MG/ML
INJECTION INTRAMUSCULAR; INTRAVENOUS PRN
Status: DISCONTINUED | OUTPATIENT
Start: 2022-07-05 | End: 2022-07-05

## 2022-07-05 RX ORDER — NALOXONE HYDROCHLORIDE 0.4 MG/ML
0.4 INJECTION, SOLUTION INTRAMUSCULAR; INTRAVENOUS; SUBCUTANEOUS
Status: DISCONTINUED | OUTPATIENT
Start: 2022-07-05 | End: 2022-07-08 | Stop reason: HOSPADM

## 2022-07-05 RX ORDER — ALBUMIN, HUMAN INJ 5% 5 %
SOLUTION INTRAVENOUS CONTINUOUS PRN
Status: DISCONTINUED | OUTPATIENT
Start: 2022-07-05 | End: 2022-07-05

## 2022-07-05 RX ORDER — SIMETHICONE 80 MG
80 TABLET,CHEWABLE ORAL 4 TIMES DAILY PRN
Status: DISCONTINUED | OUTPATIENT
Start: 2022-07-05 | End: 2022-07-08 | Stop reason: HOSPADM

## 2022-07-05 RX ORDER — MAGNESIUM SULFATE IN WATER 40 MG/ML
2 INJECTION, SOLUTION INTRAVENOUS CONTINUOUS
Status: DISCONTINUED | OUTPATIENT
Start: 2022-07-05 | End: 2022-07-08 | Stop reason: HOSPADM

## 2022-07-05 RX ORDER — MAGNESIUM SULFATE HEPTAHYDRATE 40 MG/ML
INJECTION, SOLUTION INTRAVENOUS
Status: DISCONTINUED
Start: 2022-07-05 | End: 2022-07-05 | Stop reason: HOSPADM

## 2022-07-05 RX ORDER — MISOPROSTOL 200 UG/1
400 TABLET ORAL
Status: DISCONTINUED | OUTPATIENT
Start: 2022-07-05 | End: 2022-07-05

## 2022-07-05 RX ORDER — DIPHENHYDRAMINE HCL 25 MG
25 CAPSULE ORAL EVERY 6 HOURS PRN
Status: DISCONTINUED | OUTPATIENT
Start: 2022-07-05 | End: 2022-07-06

## 2022-07-05 RX ORDER — FAMOTIDINE 10 MG
20 TABLET ORAL ONCE
Status: COMPLETED | OUTPATIENT
Start: 2022-07-05 | End: 2022-07-05

## 2022-07-05 RX ORDER — CITRIC ACID/SODIUM CITRATE 334-500MG
30 SOLUTION, ORAL ORAL
Status: COMPLETED | OUTPATIENT
Start: 2022-07-05 | End: 2022-07-05

## 2022-07-05 RX ORDER — CARBOPROST TROMETHAMINE 250 UG/ML
250 INJECTION, SOLUTION INTRAMUSCULAR
Status: DISCONTINUED | OUTPATIENT
Start: 2022-07-05 | End: 2022-07-05

## 2022-07-05 RX ORDER — DIPHENHYDRAMINE HYDROCHLORIDE 50 MG/ML
25 INJECTION INTRAMUSCULAR; INTRAVENOUS EVERY 6 HOURS PRN
Status: DISCONTINUED | OUTPATIENT
Start: 2022-07-05 | End: 2022-07-06

## 2022-07-05 RX ORDER — METOCLOPRAMIDE 10 MG/1
10 TABLET ORAL EVERY 6 HOURS PRN
Status: DISCONTINUED | OUTPATIENT
Start: 2022-07-05 | End: 2022-07-05 | Stop reason: HOSPADM

## 2022-07-05 RX ORDER — OXYTOCIN 10 [USP'U]/ML
10 INJECTION, SOLUTION INTRAMUSCULAR; INTRAVENOUS
Status: DISCONTINUED | OUTPATIENT
Start: 2022-07-05 | End: 2022-07-05

## 2022-07-05 RX ORDER — MAGNESIUM SULFATE HEPTAHYDRATE 500 MG/ML
10 INJECTION, SOLUTION INTRAMUSCULAR; INTRAVENOUS
Status: DISCONTINUED | OUTPATIENT
Start: 2022-07-05 | End: 2022-07-08 | Stop reason: HOSPADM

## 2022-07-05 RX ORDER — SODIUM CHLORIDE, SODIUM LACTATE, POTASSIUM CHLORIDE, CALCIUM CHLORIDE 600; 310; 30; 20 MG/100ML; MG/100ML; MG/100ML; MG/100ML
INJECTION, SOLUTION INTRAVENOUS CONTINUOUS
Status: DISCONTINUED | OUTPATIENT
Start: 2022-07-05 | End: 2022-07-05 | Stop reason: HOSPADM

## 2022-07-05 RX ORDER — OXYCODONE HYDROCHLORIDE 5 MG/1
5-10 TABLET ORAL EVERY 4 HOURS PRN
Status: DISCONTINUED | OUTPATIENT
Start: 2022-07-05 | End: 2022-07-08 | Stop reason: HOSPADM

## 2022-07-05 RX ORDER — SODIUM CHLORIDE, SODIUM LACTATE, POTASSIUM CHLORIDE, CALCIUM CHLORIDE 600; 310; 30; 20 MG/100ML; MG/100ML; MG/100ML; MG/100ML
10-125 INJECTION, SOLUTION INTRAVENOUS CONTINUOUS
Status: DISCONTINUED | OUTPATIENT
Start: 2022-07-05 | End: 2022-07-05

## 2022-07-05 RX ORDER — CEFAZOLIN SODIUM/WATER 2 G/20 ML
2 SYRINGE (ML) INTRAVENOUS
Status: COMPLETED | OUTPATIENT
Start: 2022-07-05 | End: 2022-07-05

## 2022-07-05 RX ORDER — AMOXICILLIN 250 MG
2 CAPSULE ORAL 2 TIMES DAILY
Status: DISCONTINUED | OUTPATIENT
Start: 2022-07-05 | End: 2022-07-08 | Stop reason: HOSPADM

## 2022-07-05 RX ORDER — NIFEDIPINE 10 MG/1
10 CAPSULE ORAL ONCE
Status: COMPLETED | OUTPATIENT
Start: 2022-07-05 | End: 2022-07-05

## 2022-07-05 RX ORDER — BUPIVACAINE HYDROCHLORIDE 7.5 MG/ML
INJECTION, SOLUTION INTRASPINAL
Status: COMPLETED | OUTPATIENT
Start: 2022-07-05 | End: 2022-07-05

## 2022-07-05 RX ORDER — OXYCODONE HYDROCHLORIDE 5 MG/1
5 TABLET ORAL EVERY 4 HOURS PRN
Status: DISCONTINUED | OUTPATIENT
Start: 2022-07-05 | End: 2022-07-05

## 2022-07-05 RX ORDER — ONDANSETRON 4 MG/1
4 TABLET, ORALLY DISINTEGRATING ORAL EVERY 6 HOURS PRN
Status: DISCONTINUED | OUTPATIENT
Start: 2022-07-05 | End: 2022-07-05 | Stop reason: HOSPADM

## 2022-07-05 RX ORDER — SODIUM CHLORIDE, SODIUM LACTATE, POTASSIUM CHLORIDE, CALCIUM CHLORIDE 600; 310; 30; 20 MG/100ML; MG/100ML; MG/100ML; MG/100ML
INJECTION, SOLUTION INTRAVENOUS CONTINUOUS PRN
Status: DISCONTINUED | OUTPATIENT
Start: 2022-07-05 | End: 2022-07-05

## 2022-07-05 RX ORDER — HYDROMORPHONE HCL IN WATER/PF 6 MG/30 ML
0.2 PATIENT CONTROLLED ANALGESIA SYRINGE INTRAVENOUS EVERY 5 MIN PRN
Status: DISCONTINUED | OUTPATIENT
Start: 2022-07-05 | End: 2022-07-05 | Stop reason: HOSPADM

## 2022-07-05 RX ORDER — NALOXONE HYDROCHLORIDE 0.4 MG/ML
0.2 INJECTION, SOLUTION INTRAMUSCULAR; INTRAVENOUS; SUBCUTANEOUS
Status: DISCONTINUED | OUTPATIENT
Start: 2022-07-05 | End: 2022-07-08 | Stop reason: HOSPADM

## 2022-07-05 RX ORDER — MISOPROSTOL 200 UG/1
400 TABLET ORAL
Status: DISCONTINUED | OUTPATIENT
Start: 2022-07-05 | End: 2022-07-08 | Stop reason: HOSPADM

## 2022-07-05 RX ORDER — MAGNESIUM SULFATE HEPTAHYDRATE 40 MG/ML
4 INJECTION, SOLUTION INTRAVENOUS
Status: DISCONTINUED | OUTPATIENT
Start: 2022-07-05 | End: 2022-07-05

## 2022-07-05 RX ORDER — OXYTOCIN/0.9 % SODIUM CHLORIDE 30/500 ML
340 PLASTIC BAG, INJECTION (ML) INTRAVENOUS CONTINUOUS PRN
Status: DISCONTINUED | OUTPATIENT
Start: 2022-07-05 | End: 2022-07-05

## 2022-07-05 RX ORDER — SODIUM CHLORIDE, SODIUM LACTATE, POTASSIUM CHLORIDE, CALCIUM CHLORIDE 600; 310; 30; 20 MG/100ML; MG/100ML; MG/100ML; MG/100ML
10-125 INJECTION, SOLUTION INTRAVENOUS CONTINUOUS
Status: DISCONTINUED | OUTPATIENT
Start: 2022-07-05 | End: 2022-07-08 | Stop reason: HOSPADM

## 2022-07-05 RX ORDER — MAGNESIUM SULFATE HEPTAHYDRATE 40 MG/ML
4 INJECTION, SOLUTION INTRAVENOUS ONCE
Status: COMPLETED | OUTPATIENT
Start: 2022-07-05 | End: 2022-07-05

## 2022-07-05 RX ORDER — HYDROCORTISONE 25 MG/G
CREAM TOPICAL 3 TIMES DAILY PRN
Status: DISCONTINUED | OUTPATIENT
Start: 2022-07-05 | End: 2022-07-08 | Stop reason: HOSPADM

## 2022-07-05 RX ORDER — BISACODYL 10 MG
10 SUPPOSITORY, RECTAL RECTAL DAILY PRN
Status: DISCONTINUED | OUTPATIENT
Start: 2022-07-07 | End: 2022-07-08 | Stop reason: HOSPADM

## 2022-07-05 RX ORDER — FENTANYL CITRATE 50 UG/ML
25 INJECTION, SOLUTION INTRAMUSCULAR; INTRAVENOUS EVERY 5 MIN PRN
Status: DISCONTINUED | OUTPATIENT
Start: 2022-07-05 | End: 2022-07-05 | Stop reason: HOSPADM

## 2022-07-05 RX ORDER — CEFAZOLIN SODIUM/WATER 2 G/20 ML
2 SYRINGE (ML) INTRAVENOUS SEE ADMIN INSTRUCTIONS
Status: DISCONTINUED | OUTPATIENT
Start: 2022-07-05 | End: 2022-07-05

## 2022-07-05 RX ORDER — MAGNESIUM SULFATE HEPTAHYDRATE 40 MG/ML
INJECTION, SOLUTION INTRAVENOUS
Status: COMPLETED
Start: 2022-07-05 | End: 2022-07-05

## 2022-07-05 RX ORDER — MAGNESIUM SULFATE HEPTAHYDRATE 40 MG/ML
2 INJECTION, SOLUTION INTRAVENOUS
Status: DISCONTINUED | OUTPATIENT
Start: 2022-07-05 | End: 2022-07-05

## 2022-07-05 RX ORDER — ONDANSETRON 4 MG/1
4 TABLET, ORALLY DISINTEGRATING ORAL EVERY 6 HOURS PRN
Status: DISCONTINUED | OUTPATIENT
Start: 2022-07-05 | End: 2022-07-08 | Stop reason: HOSPADM

## 2022-07-05 RX ORDER — PROCHLORPERAZINE MALEATE 10 MG
10 TABLET ORAL EVERY 6 HOURS PRN
Status: DISCONTINUED | OUTPATIENT
Start: 2022-07-05 | End: 2022-07-08 | Stop reason: HOSPADM

## 2022-07-05 RX ORDER — TRANEXAMIC ACID 10 MG/ML
1 INJECTION, SOLUTION INTRAVENOUS EVERY 30 MIN PRN
Status: DISCONTINUED | OUTPATIENT
Start: 2022-07-05 | End: 2022-07-05

## 2022-07-05 RX ORDER — METOCLOPRAMIDE HYDROCHLORIDE 5 MG/ML
10 INJECTION INTRAMUSCULAR; INTRAVENOUS EVERY 6 HOURS PRN
Status: DISCONTINUED | OUTPATIENT
Start: 2022-07-05 | End: 2022-07-05 | Stop reason: HOSPADM

## 2022-07-05 RX ORDER — ACETAMINOPHEN 325 MG/1
650 TABLET ORAL EVERY 4 HOURS PRN
Status: DISCONTINUED | OUTPATIENT
Start: 2022-07-08 | End: 2022-07-05

## 2022-07-05 RX ORDER — POLYETHYLENE GLYCOL 3350 17 G/17G
17 POWDER, FOR SOLUTION ORAL DAILY
Status: DISCONTINUED | OUTPATIENT
Start: 2022-07-05 | End: 2022-07-08 | Stop reason: HOSPADM

## 2022-07-05 RX ORDER — ONDANSETRON 2 MG/ML
INJECTION INTRAMUSCULAR; INTRAVENOUS
Status: COMPLETED
Start: 2022-07-05 | End: 2022-07-05

## 2022-07-05 RX ORDER — OXYTOCIN 10 [USP'U]/ML
10 INJECTION, SOLUTION INTRAMUSCULAR; INTRAVENOUS
Status: DISCONTINUED | OUTPATIENT
Start: 2022-07-05 | End: 2022-07-08 | Stop reason: HOSPADM

## 2022-07-05 RX ORDER — LIDOCAINE 40 MG/G
CREAM TOPICAL
Status: DISCONTINUED | OUTPATIENT
Start: 2022-07-05 | End: 2022-07-08 | Stop reason: HOSPADM

## 2022-07-05 RX ORDER — METOCLOPRAMIDE HYDROCHLORIDE 5 MG/ML
INJECTION INTRAMUSCULAR; INTRAVENOUS
Status: COMPLETED
Start: 2022-07-05 | End: 2022-07-05

## 2022-07-05 RX ORDER — LORAZEPAM 2 MG/ML
2 INJECTION INTRAMUSCULAR
Status: DISCONTINUED | OUTPATIENT
Start: 2022-07-05 | End: 2022-07-05

## 2022-07-05 RX ORDER — MISOPROSTOL 200 UG/1
800 TABLET ORAL
Status: DISCONTINUED | OUTPATIENT
Start: 2022-07-05 | End: 2022-07-05

## 2022-07-05 RX ORDER — NIFEDIPINE 10 MG/1
10-20 CAPSULE ORAL
Status: DISCONTINUED | OUTPATIENT
Start: 2022-07-05 | End: 2022-07-05

## 2022-07-05 RX ORDER — OXYTOCIN/0.9 % SODIUM CHLORIDE 30/500 ML
PLASTIC BAG, INJECTION (ML) INTRAVENOUS CONTINUOUS PRN
Status: DISCONTINUED | OUTPATIENT
Start: 2022-07-05 | End: 2022-07-05

## 2022-07-05 RX ORDER — METHYLERGONOVINE MALEATE 0.2 MG/ML
200 INJECTION INTRAVENOUS
Status: DISCONTINUED | OUTPATIENT
Start: 2022-07-05 | End: 2022-07-05

## 2022-07-05 RX ORDER — CALCIUM GLUCONATE 94 MG/ML
1 INJECTION, SOLUTION INTRAVENOUS
Status: DISCONTINUED | OUTPATIENT
Start: 2022-07-05 | End: 2022-07-08 | Stop reason: HOSPADM

## 2022-07-05 RX ORDER — ACETAMINOPHEN 325 MG/1
975 TABLET ORAL ONCE
Status: DISCONTINUED | OUTPATIENT
Start: 2022-07-05 | End: 2022-07-05

## 2022-07-05 RX ORDER — PROCHLORPERAZINE MALEATE 5 MG
10 TABLET ORAL EVERY 6 HOURS PRN
Status: DISCONTINUED | OUTPATIENT
Start: 2022-07-05 | End: 2022-07-05 | Stop reason: HOSPADM

## 2022-07-05 RX ORDER — MORPHINE SULFATE 1 MG/ML
INJECTION, SOLUTION EPIDURAL; INTRATHECAL; INTRAVENOUS
Status: COMPLETED | OUTPATIENT
Start: 2022-07-05 | End: 2022-07-05

## 2022-07-05 RX ORDER — ONDANSETRON 2 MG/ML
4 INJECTION INTRAMUSCULAR; INTRAVENOUS EVERY 6 HOURS PRN
Status: DISCONTINUED | OUTPATIENT
Start: 2022-07-05 | End: 2022-07-08 | Stop reason: HOSPADM

## 2022-07-05 RX ORDER — TRANEXAMIC ACID 10 MG/ML
1 INJECTION, SOLUTION INTRAVENOUS EVERY 30 MIN PRN
Status: DISCONTINUED | OUTPATIENT
Start: 2022-07-05 | End: 2022-07-08 | Stop reason: HOSPADM

## 2022-07-05 RX ORDER — PROCHLORPERAZINE 25 MG
25 SUPPOSITORY, RECTAL RECTAL EVERY 12 HOURS PRN
Status: DISCONTINUED | OUTPATIENT
Start: 2022-07-05 | End: 2022-07-08 | Stop reason: HOSPADM

## 2022-07-05 RX ORDER — METOCLOPRAMIDE 10 MG/1
10 TABLET ORAL EVERY 6 HOURS PRN
Status: DISCONTINUED | OUTPATIENT
Start: 2022-07-05 | End: 2022-07-08 | Stop reason: HOSPADM

## 2022-07-05 RX ORDER — MAGNESIUM SULFATE HEPTAHYDRATE 40 MG/ML
2 INJECTION, SOLUTION INTRAVENOUS
Status: DISCONTINUED | OUTPATIENT
Start: 2022-07-05 | End: 2022-07-08 | Stop reason: HOSPADM

## 2022-07-05 RX ORDER — CARBOPROST TROMETHAMINE 250 UG/ML
250 INJECTION, SOLUTION INTRAMUSCULAR
Status: DISCONTINUED | OUTPATIENT
Start: 2022-07-05 | End: 2022-07-08 | Stop reason: HOSPADM

## 2022-07-05 RX ORDER — PROCHLORPERAZINE 25 MG
25 SUPPOSITORY, RECTAL RECTAL EVERY 12 HOURS PRN
Status: DISCONTINUED | OUTPATIENT
Start: 2022-07-05 | End: 2022-07-05 | Stop reason: HOSPADM

## 2022-07-05 RX ORDER — MISOPROSTOL 200 UG/1
800 TABLET ORAL
Status: DISCONTINUED | OUTPATIENT
Start: 2022-07-05 | End: 2022-07-08 | Stop reason: HOSPADM

## 2022-07-05 RX ORDER — METOCLOPRAMIDE HYDROCHLORIDE 5 MG/ML
10 INJECTION INTRAMUSCULAR; INTRAVENOUS EVERY 6 HOURS PRN
Status: DISCONTINUED | OUTPATIENT
Start: 2022-07-05 | End: 2022-07-08 | Stop reason: HOSPADM

## 2022-07-05 RX ORDER — MAGNESIUM SULFATE HEPTAHYDRATE 40 MG/ML
4 INJECTION, SOLUTION INTRAVENOUS
Status: DISCONTINUED | OUTPATIENT
Start: 2022-07-05 | End: 2022-07-08 | Stop reason: HOSPADM

## 2022-07-05 RX ORDER — FENTANYL CITRATE 50 UG/ML
INJECTION, SOLUTION INTRAMUSCULAR; INTRAVENOUS PRN
Status: DISCONTINUED | OUTPATIENT
Start: 2022-07-05 | End: 2022-07-05

## 2022-07-05 RX ORDER — ONDANSETRON 4 MG/1
4 TABLET, ORALLY DISINTEGRATING ORAL EVERY 30 MIN PRN
Status: DISCONTINUED | OUTPATIENT
Start: 2022-07-05 | End: 2022-07-05

## 2022-07-05 RX ORDER — DEXMEDETOMIDINE HYDROCHLORIDE 4 UG/ML
INJECTION, SOLUTION INTRAVENOUS PRN
Status: DISCONTINUED | OUTPATIENT
Start: 2022-07-05 | End: 2022-07-05

## 2022-07-05 RX ORDER — MODIFIED LANOLIN
OINTMENT (GRAM) TOPICAL
Status: DISCONTINUED | OUTPATIENT
Start: 2022-07-05 | End: 2022-07-08 | Stop reason: HOSPADM

## 2022-07-05 RX ORDER — CALCIUM GLUCONATE 94 MG/ML
1 INJECTION, SOLUTION INTRAVENOUS
Status: DISCONTINUED | OUTPATIENT
Start: 2022-07-05 | End: 2022-07-05

## 2022-07-05 RX ORDER — METHYLERGONOVINE MALEATE 0.2 MG/ML
200 INJECTION INTRAVENOUS
Status: DISCONTINUED | OUTPATIENT
Start: 2022-07-05 | End: 2022-07-08 | Stop reason: HOSPADM

## 2022-07-05 RX ORDER — CEFAZOLIN SODIUM/WATER 2 G/20 ML
SYRINGE (ML) INTRAVENOUS
Status: DISCONTINUED
Start: 2022-07-05 | End: 2022-07-05 | Stop reason: HOSPADM

## 2022-07-05 RX ORDER — OXYTOCIN/0.9 % SODIUM CHLORIDE 30/500 ML
100-340 PLASTIC BAG, INJECTION (ML) INTRAVENOUS CONTINUOUS PRN
Status: DISCONTINUED | OUTPATIENT
Start: 2022-07-05 | End: 2022-07-05

## 2022-07-05 RX ORDER — LIDOCAINE 40 MG/G
CREAM TOPICAL
Status: DISCONTINUED | OUTPATIENT
Start: 2022-07-05 | End: 2022-07-05

## 2022-07-05 RX ORDER — ONDANSETRON 2 MG/ML
4 INJECTION INTRAMUSCULAR; INTRAVENOUS EVERY 6 HOURS PRN
Status: DISCONTINUED | OUTPATIENT
Start: 2022-07-05 | End: 2022-07-05 | Stop reason: HOSPADM

## 2022-07-05 RX ORDER — DEXTROSE, SODIUM CHLORIDE, SODIUM LACTATE, POTASSIUM CHLORIDE, AND CALCIUM CHLORIDE 5; .6; .31; .03; .02 G/100ML; G/100ML; G/100ML; G/100ML; G/100ML
INJECTION, SOLUTION INTRAVENOUS CONTINUOUS
Status: DISCONTINUED | OUTPATIENT
Start: 2022-07-05 | End: 2022-07-08 | Stop reason: HOSPADM

## 2022-07-05 RX ORDER — ACETAMINOPHEN 325 MG/1
TABLET ORAL
Status: COMPLETED
Start: 2022-07-05 | End: 2022-07-05

## 2022-07-05 RX ORDER — LABETALOL HYDROCHLORIDE 5 MG/ML
20 INJECTION, SOLUTION INTRAVENOUS
Status: DISCONTINUED | OUTPATIENT
Start: 2022-07-05 | End: 2022-07-05

## 2022-07-05 RX ORDER — MAGNESIUM SULFATE HEPTAHYDRATE 500 MG/ML
10 INJECTION, SOLUTION INTRAMUSCULAR; INTRAVENOUS
Status: DISCONTINUED | OUTPATIENT
Start: 2022-07-05 | End: 2022-07-05

## 2022-07-05 RX ORDER — HYDRALAZINE HYDROCHLORIDE 20 MG/ML
10 INJECTION INTRAMUSCULAR; INTRAVENOUS
Status: DISCONTINUED | OUTPATIENT
Start: 2022-07-05 | End: 2022-07-08 | Stop reason: HOSPADM

## 2022-07-05 RX ORDER — SODIUM CHLORIDE, SODIUM LACTATE, POTASSIUM CHLORIDE, CALCIUM CHLORIDE 600; 310; 30; 20 MG/100ML; MG/100ML; MG/100ML; MG/100ML
INJECTION, SOLUTION INTRAVENOUS CONTINUOUS
Status: DISCONTINUED | OUTPATIENT
Start: 2022-07-05 | End: 2022-07-05

## 2022-07-05 RX ORDER — LORAZEPAM 2 MG/ML
2 INJECTION INTRAMUSCULAR
Status: DISCONTINUED | OUTPATIENT
Start: 2022-07-05 | End: 2022-07-08 | Stop reason: HOSPADM

## 2022-07-05 RX ORDER — NIFEDIPINE 10 MG/1
CAPSULE ORAL
Status: COMPLETED
Start: 2022-07-05 | End: 2022-07-05

## 2022-07-05 RX ORDER — ACETAMINOPHEN 325 MG/1
975 TABLET ORAL EVERY 6 HOURS
Status: DISCONTINUED | OUTPATIENT
Start: 2022-07-05 | End: 2022-07-05

## 2022-07-05 RX ORDER — ONDANSETRON 2 MG/ML
4 INJECTION INTRAMUSCULAR; INTRAVENOUS EVERY 30 MIN PRN
Status: DISCONTINUED | OUTPATIENT
Start: 2022-07-05 | End: 2022-07-05

## 2022-07-05 RX ORDER — OXYTOCIN/0.9 % SODIUM CHLORIDE 30/500 ML
340 PLASTIC BAG, INJECTION (ML) INTRAVENOUS CONTINUOUS PRN
Status: DISCONTINUED | OUTPATIENT
Start: 2022-07-05 | End: 2022-07-08 | Stop reason: HOSPADM

## 2022-07-05 RX ORDER — AMOXICILLIN 250 MG
1 CAPSULE ORAL 2 TIMES DAILY
Status: DISCONTINUED | OUTPATIENT
Start: 2022-07-05 | End: 2022-07-08 | Stop reason: HOSPADM

## 2022-07-05 RX ADMIN — MAGNESIUM SULFATE IN WATER 2 G/HR: 40 INJECTION, SOLUTION INTRAVENOUS at 19:21

## 2022-07-05 RX ADMIN — ONDANSETRON 4 MG: 2 INJECTION INTRAMUSCULAR; INTRAVENOUS at 09:19

## 2022-07-05 RX ADMIN — TRANEXAMIC ACID 1 G: 1 INJECTION, SOLUTION INTRAVENOUS at 07:09

## 2022-07-05 RX ADMIN — ONDANSETRON 4 MG: 2 INJECTION INTRAMUSCULAR; INTRAVENOUS at 06:56

## 2022-07-05 RX ADMIN — FENTANYL CITRATE 50 MCG: 50 INJECTION, SOLUTION INTRAMUSCULAR; INTRAVENOUS at 07:29

## 2022-07-05 RX ADMIN — ACETAMINOPHEN 975 MG: 325 TABLET ORAL at 10:01

## 2022-07-05 RX ADMIN — DEXMEDETOMIDINE 4 MCG: 100 INJECTION, SOLUTION, CONCENTRATE INTRAVENOUS at 08:10

## 2022-07-05 RX ADMIN — METOCLOPRAMIDE 10 MG: 5 INJECTION, SOLUTION INTRAMUSCULAR; INTRAVENOUS at 09:43

## 2022-07-05 RX ADMIN — ALBUMIN (HUMAN): 12.5 SOLUTION INTRAVENOUS at 08:09

## 2022-07-05 RX ADMIN — MAGNESIUM SULFATE IN WATER 2 G/HR: 40 INJECTION, SOLUTION INTRAVENOUS at 08:11

## 2022-07-05 RX ADMIN — FENTANYL CITRATE 25 MCG: 50 INJECTION, SOLUTION INTRAMUSCULAR; INTRAVENOUS at 07:53

## 2022-07-05 RX ADMIN — MORPHINE SULFATE 0.1 MG: 1 INJECTION, SOLUTION EPIDURAL; INTRATHECAL; INTRAVENOUS at 06:54

## 2022-07-05 RX ADMIN — DEXMEDETOMIDINE 8 MCG: 100 INJECTION, SOLUTION, CONCENTRATE INTRAVENOUS at 08:03

## 2022-07-05 RX ADMIN — BUPIVACAINE HYDROCHLORIDE IN DEXTROSE 1.6 ML: 7.5 INJECTION, SOLUTION SUBARACHNOID at 06:54

## 2022-07-05 RX ADMIN — SODIUM CITRATE AND CITRIC ACID MONOHYDRATE 30 ML: 500; 334 SOLUTION ORAL at 06:15

## 2022-07-05 RX ADMIN — DEXMEDETOMIDINE 8 MCG: 100 INJECTION, SOLUTION, CONCENTRATE INTRAVENOUS at 07:55

## 2022-07-05 RX ADMIN — Medication 340 ML/HR: at 07:18

## 2022-07-05 RX ADMIN — Medication: at 10:17

## 2022-07-05 RX ADMIN — PHENYLEPHRINE HYDROCHLORIDE 0.5 MCG/KG/MIN: 10 INJECTION INTRAVENOUS at 06:54

## 2022-07-05 RX ADMIN — SENNOSIDES AND DOCUSATE SODIUM 1 TABLET: 50; 8.6 TABLET ORAL at 20:23

## 2022-07-05 RX ADMIN — SODIUM CHLORIDE, POTASSIUM CHLORIDE, SODIUM LACTATE AND CALCIUM CHLORIDE 75 ML/HR: 600; 310; 30; 20 INJECTION, SOLUTION INTRAVENOUS at 11:08

## 2022-07-05 RX ADMIN — SODIUM CHLORIDE, POTASSIUM CHLORIDE, SODIUM LACTATE AND CALCIUM CHLORIDE: 600; 310; 30; 20 INJECTION, SOLUTION INTRAVENOUS at 07:03

## 2022-07-05 RX ADMIN — SODIUM CHLORIDE, POTASSIUM CHLORIDE, SODIUM LACTATE AND CALCIUM CHLORIDE: 600; 310; 30; 20 INJECTION, SOLUTION INTRAVENOUS at 07:23

## 2022-07-05 RX ADMIN — SODIUM CHLORIDE, POTASSIUM CHLORIDE, SODIUM LACTATE AND CALCIUM CHLORIDE 75 ML/HR: 600; 310; 30; 20 INJECTION, SOLUTION INTRAVENOUS at 19:23

## 2022-07-05 RX ADMIN — FENTANYL CITRATE 25 MCG: 50 INJECTION, SOLUTION INTRAMUSCULAR; INTRAVENOUS at 07:54

## 2022-07-05 RX ADMIN — FENTANYL CITRATE 50 MCG: 50 INJECTION, SOLUTION INTRAMUSCULAR; INTRAVENOUS at 07:21

## 2022-07-05 RX ADMIN — FENTANYL CITRATE 25 MCG: 50 INJECTION, SOLUTION INTRAMUSCULAR; INTRAVENOUS at 07:38

## 2022-07-05 RX ADMIN — SODIUM CHLORIDE, POTASSIUM CHLORIDE, SODIUM LACTATE AND CALCIUM CHLORIDE 75 ML/HR: 600; 310; 30; 20 INJECTION, SOLUTION INTRAVENOUS at 05:43

## 2022-07-05 RX ADMIN — FAMOTIDINE 20 MG: 10 TABLET ORAL at 06:08

## 2022-07-05 RX ADMIN — Medication 2 G: at 06:47

## 2022-07-05 RX ADMIN — FENTANYL CITRATE 25 MCG: 50 INJECTION, SOLUTION INTRAMUSCULAR; INTRAVENOUS at 07:35

## 2022-07-05 RX ADMIN — NIFEDIPINE 10 MG: 10 CAPSULE ORAL at 05:23

## 2022-07-05 RX ADMIN — MAGNESIUM SULFATE HEPTAHYDRATE 4 G: 40 INJECTION, SOLUTION INTRAVENOUS at 07:41

## 2022-07-05 ASSESSMENT — ACTIVITIES OF DAILY LIVING (ADL)
ADLS_ACUITY_SCORE: 18
ADLS_ACUITY_SCORE: 35
ADLS_ACUITY_SCORE: 18

## 2022-07-05 ASSESSMENT — LIFESTYLE VARIABLES: TOBACCO_USE: 0

## 2022-07-05 NOTE — ANESTHESIA POSTPROCEDURE EVALUATION
Patient: Anh Roberts    Procedure: Procedure(s):   SECTION, WITH POSTPARTUM TUBAL LIGATION       Anesthesia Type:  Spinal    Note:     Postop Pain Control: Uneventful            Sign Out: Well controlled pain   PONV: No   Neuro/Psych: Uneventful            Sign Out: Acceptable/Baseline neuro status   Airway/Respiratory: Uneventful            Sign Out: Acceptable/Baseline resp. status   CV/Hemodynamics: Uneventful            Sign Out: Acceptable CV status; No obvious hypovolemia; No obvious fluid overload   Other NRE: NONE   DID A NON-ROUTINE EVENT OCCUR? No           Last vitals:  Vitals Value Taken Time   /78 22 1330   Temp 36.5  C (97.7  F) 22 1330   Pulse 63 22 1330   Resp 15 22 1330   SpO2 100 % 22 1330       Electronically Signed By: Félix Wharton MD  2022  1:51 PM

## 2022-07-05 NOTE — ANESTHESIA PREPROCEDURE EVALUATION
Anesthesia Pre-Procedure Evaluation    Patient: Anh Roberts   MRN: [id number] : 1980        Procedure : Procedure(s):   SECTION, WITH POSTPARTUM TUBAL LIGATION          History reviewed. No pertinent past medical history.   Past Surgical History:   Procedure Laterality Date     NO PAST SURGERIES        Allergies   Allergen Reactions     Ibuprofen Swelling     Ignacia-Hanson Swelling     Facial swelling and rash     Aspirin      Swelling of extremities,       Social History     Tobacco Use     Smoking status: Never Smoker     Smokeless tobacco: Never Used   Substance Use Topics     Alcohol use: Never      Wt Readings from Last 1 Encounters:   22 68.5 kg (151 lb)        Anesthesia Evaluation   Pt has had prior anesthetic.     No history of anesthetic complications       ROS/MED HX  ENT/Pulmonary:    (-) tobacco use   Neurologic:       Cardiovascular:     (+) hypertension-----    METS/Exercise Tolerance:     Hematologic:       Musculoskeletal:       GI/Hepatic:       Renal/Genitourinary:       Endo:       Psychiatric/Substance Use:       Infectious Disease:       Malignancy:       Other:            Physical Exam    Airway        Mallampati: I    Neck ROM: full     Respiratory Devices and Support         Dental           Cardiovascular   cardiovascular exam normal          Pulmonary   pulmonary exam normal                OUTSIDE LABS:  CBC:   Lab Results   Component Value Date    WBC 7.5 2022    WBC 7.0 2022    HGB 14.1 2022    HGB 12.2 2022    HCT 42.9 2022    HCT 38.5 2022     2022     2022     BMP:   Lab Results   Component Value Date     2022     2022    POTASSIUM 3.8 2022    POTASSIUM 3.8 2022    CHLORIDE 109 2022    CHLORIDE 109 2022    CO2 19 (L) 2022    CO2 18 (L) 2022    BUN 9 2022    BUN 7 2022    CR 0.54 2022    CR 0.39 (L) 2022    GLC  102 (H) 07/05/2022    GLC 82 02/16/2022     COAGS:   Lab Results   Component Value Date    PTT 29 07/05/2022    INR 0.88 07/05/2022    FIBR 407 07/05/2022     POC:   Lab Results   Component Value Date    HCG Positive (A) 12/23/2021     HEPATIC:   Lab Results   Component Value Date    ALBUMIN 2.7 (L) 07/05/2022    PROTTOTAL 6.9 07/05/2022    ALT 64 (H) 07/05/2022    AST 82 (H) 07/05/2022    ALKPHOS 119 07/05/2022    BILITOTAL 0.4 07/05/2022     OTHER:   Lab Results   Component Value Date    JOSY 10.0 07/05/2022       Anesthesia Plan    ASA Status:  2, emergent    NPO Status:  NPO Appropriate    Anesthesia Type: Spinal.         Techniques and Equipment:     - Lines/Monitors: 2nd IV     Consents    Anesthesia Plan(s) and associated risks, benefits, and realistic alternatives discussed. Questions answered and patient/representative(s) expressed understanding.    - Discussed:     - Discussed with:  Patient         Postoperative Care    Pain management: IV analgesics, intrathecal morphine.   PONV prophylaxis: Ondansetron (or other 5HT-3)     Comments:                Vinay Morrell MD

## 2022-07-05 NOTE — ANESTHESIA PROCEDURE NOTES
Intrathecal injection Procedure Note    Pre-Procedure   Staff -        Anesthesiologist:  Vinay Morrell MD       Performed By: anesthesiologist       Location: OR       Pre-Anesthestic Checklist: patient identified, IV checked, risks and benefits discussed, informed consent, monitors and equipment checked, pre-op evaluation and at physician/surgeon's request  Timeout:       Correct Patient: Yes        Correct Procedure: Yes        Correct Site: Yes        Correct Position: Yes   Procedure Documentation  Procedure: intrathecal injection       Patient Position: sitting       Patient Prep/Sterile Barriers: sterile gloves, mask, patient draped       Skin prep: Betadine       Insertion Site: L3-4. (midline approach).       Needle Gauge: 24.        Needle Length (Inches): 4        Spinal Needle Type: Pencan       Introducer used       Introducer: 20 G       # of attempts: 1 and  # of redirects:  0    Assessment/Narrative         Paresthesias: No.       CSF fluid: clear.    Medication(s) Administered   0.75% Hyperbaric Bupivacaine (Intrathecal) - Intrathecal   1.6 mL - 7/5/2022 6:54:00 AM  Morphine PF 1 mg/mL (Intrathecal) - Intrathecal   0.1 mg - 7/5/2022 6:54:00 AM

## 2022-07-05 NOTE — ANESTHESIA CARE TRANSFER NOTE
Patient: Anh Roberts    Procedure: Procedure(s):   SECTION, WITH POSTPARTUM TUBAL LIGATION       Diagnosis: * No pre-op diagnosis entered *  Diagnosis Additional Information: No value filed.    Anesthesia Type:   Spinal     Note:    Oropharynx: oropharynx clear of all foreign objects and spontaneously breathing  Level of Consciousness: awake  Oxygen Supplementation: room air    Independent Airway: airway patency satisfactory and stable  Dentition: dentition unchanged  Vital Signs Stable: post-procedure vital signs reviewed and stable  Report to RN Given: handoff report given  Patient transferred to: Labor and Delivery    Handoff Report: Identifed the Patient, Identified the Reponsible Provider, Reviewed the pertinent medical history, Discussed the surgical course, Reviewed Intra-OP anesthesia mangement and issues during anesthesia, Set expectations for post-procedure period and Allowed opportunity for questions and acknowledgement of understanding      Vitals:  Vitals Value Taken Time   /80    Temp     Pulse 63    Resp     SpO2 99        Electronically Signed By: CHAD Finney CRNA  2022  8:40 AM

## 2022-07-05 NOTE — PROGRESS NOTES
0452 Pt arrived from home, via wheelchair. Pt complaining of 8/10 epigastric pain.   0457 /89, pt, with son interpreting, no history of high blood pressure, Pt will be primary  for breech presentation and 13 cm fibroid.   0500 Jabber used for interpreting. Admission and triage questions asked.  0530 Dr Go notified of elevated BP's, labs ordered and procardia given. IV started. Plan to proceed with  due to elevated BP and epigastric pain.

## 2022-07-05 NOTE — TELEPHONE ENCOUNTER
Per Dr Sr this pt delivered early  Was still showing on OR schedule  Spoke to Kelli at [facility] for cancel  Updated epic and spreadsheet  CX all upcoming pre  appnts    Marie Ramires  Surgery Scheduler

## 2022-07-05 NOTE — PLAN OF CARE
Data: Anh Roberts transferred to 432 via wheelchair at 1045. Baby transferred via parent's arms.  Action: Receiving unit notified of transfer: Yes. Patient and family notified of room change. Report given to Harsha REDMAN RN at 1120. Belongings sent to receiving unit. Accompanied by Registered Nurse. Oriented patient to surroundings. Call light within reach. ID bands double-checked with receiving RN.  Response: Patient tolerated transfer and is stable.

## 2022-07-05 NOTE — OP NOTE
I was asked to assist with this surgical case due to complex nature including severe pre-eclampsia and large uterine fibroid.  Additional surgical skills were required due to increased risks associated with the procedure including increased risk of bleeding and difficulty with visualization due to the large fibroid.      Please see Dr. Go operative note for full details.

## 2022-07-05 NOTE — PROVIDER NOTIFICATION
"Dr Go paged, \"Could you please place orders for placenta and Rt and Lf fallopian tubes. Thank you!\"    Orders received.  "

## 2022-07-05 NOTE — PROVIDER NOTIFICATION
Dr. Sr updated on patient's critical labs, vitals, and reflexes. Orders received to check them again in the morning at 0600.

## 2022-07-05 NOTE — LACTATION NOTE
Initial visit with Mother and Father and baby girl.  Mother [place] speaking, Father speaks English and Nauruan.  Mother is on magnesium sulfate for severe pre-eclamptic features.    At time of visit infant at breast and Mother is holding infant in the cradle position and appears comfortable while breast feeding infant.  With Father interpreting, JAMES reviewed general breast feeding information.    Mother and Father educated on normal  behavior, focusing on normal feeding patterns from birth to day 3 of life.    Breastfeeding general information reviewed.   Encouraged rooming in, skin to skin, feeding on demand 8-12x/day or sooner if baby cues.    Appreciative of visit.  No further questions at this time.   Encouraged Mother to call for assistance with latch or positioning if needed.  Plan to follow up at a later time when Mother is more awake, alert, and off magnesium.  Will plan to communicate with an  via the [employer].    Arleen Barrera RN, IBCLC

## 2022-07-05 NOTE — PROVIDER NOTIFICATION
07/05/22 0515   Provider Notification   Provider Name/Title Dr. Go   Method of Notification Phone   Notification Reason Patient Arrived;Maternal Vital Sign Change;Lab/Diagnostic Study;Status Update;Other (Comment)     Dr. Go notified of patient arrival to [facility] , severe range blood pressure on arrival. Pt is having epigastric pain and nausea. Orders received per MD to have H labs drawn, PTT, Fibrinogen and INR and a type and screen. MD heading in to assess patient.

## 2022-07-05 NOTE — PLAN OF CARE
Anh delivered breech baby girl today at 0716 with francisco Salpingectomy. Received 4gm Mag load during procedure and started on 2gm/hr after. Started by CRNA in case. Reflexes normal at those checks, unable to check clonus as pt was sterile draped. Post procedure reflexes +2 with 2 beats clonus. VSS except elevated BP while vomiting, improved. Emesis x2, received zofran and reglan. Tylenol given with sips of water. Dilaudid PCA started. Incision CDI. Fundus firm without massage, +1/U. Scant rubra lochia. Mild edema. Denies preeclampsia symptoms. Working on breastfeeding. Bonding well with infant.  virtually present.

## 2022-07-05 NOTE — PROVIDER NOTIFICATION
07/05/22 1600   Provider Notification   Provider Name/Title Dr. Tawanna Sr   Method of Notification Electronic Page;Phone   Notification Reason Other     Contacted Dr. Tawanna Sr to clarify order for Tylenol. Order discontinued due to lab values.  Explained POC with pt and ; questions answered.

## 2022-07-05 NOTE — PLAN OF CARE
"  Problem: Plan of Care - These are the overarching goals to be used throughout the patient stay.    Goal: Patient-Specific Goal (Individualized)  Description: You can add care plan individualizations to a care plan. Examples of Individualization might be:  \"Parent requests to be called daily at 9am for status\", \"I have a hard time hearing out of my right ear\", or \"Do not touch me to wake me up as it startles me\".  Outcome: Ongoing, Not Progressing  Flowsheets (Taken 2022 1502)  Individualized Care Needs: Stabalize blood pressures and labs     Problem: Postoperative Nausea and Vomiting (Postpartum  Delivery)  Goal: Nausea and Vomiting Relief  Outcome: Ongoing, Not Progressing     Blood pressures stabilizing. Reflexes and clonus remain within defined limits. Patient denies headache, blurred vision, or epigastric pain. Able to stand at bedside with minimal dizziness, but did become very nauseated and vomited. Antiemetics offered, but declined at this time. Pain being well controlled with minimal use of the dilaudid PCA. Patient and support person oriented to room and floor and encouraged to ask questions. Jabber at bedside and used when discussing cares.   "

## 2022-07-05 NOTE — OP NOTE
[facility]  Operative Note     Surgery Date:  2022    Surgeon:  Alix Go MD, S    Assistants:  Tawanna Sr MD. An assistant was needed to to complex nature of the surgery due to large obstructing anteriror fibroid, need for visualization, retraction, and assistance with PPH    Pre-op Diagnosis:  1. Intrauterine pregnancy at 38w0d     2. Large obstructing anterior fibroid     3. Preeclampsia with severe features based on elevated blood pressures     4. Breech presentation      5. Desire for permanent sterilization     Post-op Diagnosis:  1. Same      2. Liveborn female infant     3. PPH due to bleeding from hysterotomy      Procedure (s):  Primary low-transverse  section with quadruple layer uterine closure via vertical midline skin incision    Bilateral salpingecotmy     Anesthesia: Spinal    Delivery QBL (mL): 1390mL    Drains: Stephens Catheter     Specimens:  Cord blood, placenta, bilateral fallopian tubes     Complications: None apparent    Indications:   Anh Roberts is a 41 year old  at 38w0d who presented to ob triage with severe epigastric pain. Her BP was noted to be persistently elevated in the severe range, requiring immediate acting antihypertensives. She has a large obstructing anterior fibroid, and breech presentation of the fetus.  section due to preE was discussed.  The risks, benefits, and alternatives of  section were discussed with the patient, and she agreed to proceed.     Findings:   1. Clear amniotic fluid  2. Liveborn female infant in breech presentation. Apgars 8 at 1 minute & 10 at 5 minutes. Weight [id number].  3. No intraabdominal or rectofascial adhesions. Large anterior fibroid just superior to PENG of uterus. Uterus was able to be exteriorized.     Procedure Details:   The patient was brought to the OR, where adequate spinal anesthesia was administered.  IV antibiotics and IV TXA were administered. She was placed in the dorsal  supine position with a slight leftward tilt. She was prepped and draped in the usual sterile fashion. A surgical time out was performed. Decision was made to proceed with a vertical midline skin incision due to the possibility of a large anterior obstructing fibroid and possibility of the need to make a fundal incision.  The incision was carried down to the underlying fascia with sharp and blunt dissection. The fascia was incised in the midline, and the incision was extended superiorly and inferiorly using blunt tracation and electro cautery. The rectus muscles were  in the midline, and the peritoneum was entered bluntly, and the opening was extended with digital pressure. An extra large Rene-O retractor was placed intraabdominally. A thorough examination of the uterus and abdomen was undertaken. Findings noted above.  The fundus was not easily accessible.  The fibroid was noted to be clear of the lower uterine segment.  Decision was made to proceed with low transverse hysterotomy. A transverse hysterotomy was made with the scalpel in the lower uterine segment, and the incision was extended with digital pressure. The infant was noted to be in the breech position, and was delivered atraumatically. The shoulders delivered easily.  No nuchal cord was noted. The cord was doubly clamped and cut, and the infant was handed off to the awaiting NICU staff. A segment of cord was cut and saved. The placenta was delivered with gentle traction on the umbilical cord and uterine massage.   The uterus was cleared of all clots and debris. Uterine tone was noted to be firm.  Closure of the hysterotomy began.  The hysterotomy was noted to be denuded superiorly and inferiorly, and bleeding was noted from the layers of myometrium which extended into the superficial layers of the fibroid superiorly.  The uterus was exteriorized. The hysterotomy was first closed with a single running locked layer of 0 Vicryl suture.  Attention  was then turned to the superior aspect of the denuded hysterotomy.  The superior aspect of the hysterotomy was closed with a running locked layer of 0 Vicryl.  In a similar manner at the lower aspect of the hysterotomy was closed in a running locked layer of 0 Vicryl suture.  This allowed for reapproximation of the superior and inferior aspects of the hysterotomy to allow for an imbricating layer.    At this time,  while the uterus was exteriorized, attention was turned to the bilateral salpingectomy portion of the procedure.  The right fallopian tube was grasped with Lavelle clamps.  The fallopian tube was followed to the fimbriated end.  Using a LigaSure device, the fallopian tube was serially clamped cauterized and cut off of the underlying mesosalpinx.  At an area approximately 1 cm from the cornua, the fallopian tube was removed.  This procedure was repeated in an identical manner on the left side.  The specimens were handed off the field to be sent to pathology.  The uterus was replaced into the abdomen at this time.  Areas of bleeding along the inferior aspect of the hysterotomy were noted and therefore an additional layer of running locked 0 Monocryl were used for hemostasis.  At this time the hysterotomy was imbricated with the running locked 0 Monocryl.  An area of bleeding at the lower aspect of the hysterotomy was identified and closed with a single figure-of-eight active using 3-0 chromic suture.  Additional areas of serosal bleeding were controlled using electrocautery.  Inspection of the hysterotomy revealed an area that was not closed at the right aspect of the hysterotomy.  This area was closed using a running locked 0 Monocryl, and this area was imbricated with 0 Monocryl.  Excellent hemostasis was noted throughout the hysterotomy. Intercede adhesion barrier was placed over the hysterotomy. IV magnesium was then initiated for seizure prophylaxis.  The fascia and rectus muscles were examined and  areas of oozing were controlled with electrocautery. The fascia was closed with a running looped 0-PDS suture. Areas of oozing in the subcutaneous tissue were controlled with electrocautery. The subcutaneous tissue was closed with 2-0 Plain gut. A subcutaneous mattress suture using 3-0 Monocrul was placed. The skin was closed with 4-0 monocryl and covered with a sterile dressing.    All sponge, needle, and instrument counts were correct. The patient tolerated the procedure well, and was transferred to recovery in stable condition.     Alix Go MD, MHS  OB/GYN  7/5/2022

## 2022-07-05 NOTE — H&P
[facility]  OB History and Physical      Anh Roberts MRN# [id number]   Age: 41 year old YOB: 1980       Patient seen with iPad      HPI:  Ms. Anh Roberts is a 41 year old  at 38w0d by 10w2d, who presents with severe sudden onset epigastric pain and several episodes of vomiting overnight since about 3am. Yesterday she also described a headache, which is since resolved. She has no history of HTN.  She denies contractions, vaginal bleeding, and loss of fluid. Normal fetal movement. Denies vision changes, SOB, chest pain, fever, chills, nausea, vomiting, constipation, diarrhea, dysuria.    Her BP was noted to be elevated to 181/89 and persistently elevated to 184/89 on admission.       Pregnancy Complications:  1.  Large anterior right sided fibroid  2.  AMA  3.  Desire for permanent sterilization     Prenatal Labs:   Lab Results   Component Value Date    AS Negative 2022    HEPBANG Nonreactive 2022    HGB 14.1 2022     GBS Status: GBS negative    OB History  OB History    Para Term  AB Living   3 2 2 0 0 2   SAB IAB Ectopic Multiple Live Births   0 0 0 0 2      # Outcome Date GA Lbr [place]/2nd Weight Sex Delivery Anes PTL Lv   3 Current            2 Term 07    F Vag-Spont   HODA      Name: Janet Ivan Term 03 40w0d 06:00 3.6 kg (7 lb 15 oz) M Vag-Spont   HODA      Birth Comments: Breast      Name: Teddy       PMHx:   History reviewed. No pertinent past medical history.    PSHx:   Past Surgical History:   Procedure Laterality Date     NO PAST SURGERIES       Meds:   Medications Prior to Admission   Medication Sig Dispense Refill Last Dose     ferrous sulfate (FEROSUL) 325 (65 Fe) MG tablet Take 1 tablet (325 mg) by mouth 3 times daily (with meals) 90 tablet 3 2022 at Unknown time     Prenatal Vit-Fe Fumarate-FA (PRENATAL VITAMIN PO) Take 1 tablet by mouth daily   Past Week at Unknown time     Allergies:   "  Allergies   Allergen Reactions     Ibuprofen Swelling     [place] Swelling     Facial swelling and rash     Aspirin      Swelling of extremities,       FmHx:   Family History   Problem Relation Age of Onset     Diabetes Mother      Unknown/Adopted Father      No Known Problems Maternal Grandmother      No Known Problems Maternal Grandfather      No Known Problems Son      No Known Problems Daughter      SocHx: She denies any tobacco, alcohol, or other drug use during this pregnancy.    ROS:   Complete 10-point ROS negative except as noted in HPI.    PE:  Vit:   Patient Vitals for the past 4 hrs:   BP Height Weight   22 0550 (!) 154/89 -- --   22 0540 (!) 155/86 -- --   22 0530 (!) 172/88 -- --   22 0520 (!) 168/85 -- --   22 0512 -- 1.549 m (5' 1\") 68.5 kg (151 lb)   22 0510 (!) 184/89 -- --   22 0500 (!) 173/87 -- --      Gen: Well-appearing, NAD  CV: Regular rate  Pulm: Breathing comfortably on RA  Abd: Soft, gravid, non-tender, +epigastric tenderness.   Ext: Trace LE edema b/l       FHT: Baseline 130, moderate variability, + accelerations, no decelerations   [place]: 3-4 contractions in 10 minutes      Assessment  Ms. Anh Roberts is a 41 year old , at 38w0d by who presents with preE w/ SF based on elevated BP.    Plan  PreE w/ SF (BP)  - Sustained severe range BP   - s/p IR nifedipine, and BP lowered to MR   - Continue to monitor   - IV antihypertensives prn   - Symptoms: severe epigastric pain  - Labs: AST 82, ALT 64. Initial concern for possible AFLP. Coags wnl, BG wnl  - Start magnesium for sz prophylaxis post delivery for 24h.   - Proceed with delivery as below     Large anterior obstructing fibroid   Desire for permanent steriliazation  - Primary  section and bilateral salpingectomy   - Discussed possible high risk CS due to fibroid. Discussed risk of hemorrhage, possible need for fundal, or classical incision. Discussed possibility of " needing hysterectomy if she has life threatening bleeding.  Will proceed with bilateral salpingectomy as long as it is safe to do so.   - We discussed risks of infection, bleeding, injury, post-operative pain. Discussed providing antibiotics during CS to attempt to prevent infection, but that infection may still occur after delivery in the uterus, abdomen, or layers of the skin. Discussed higher risk of bleeding with CS and possibility of needing emergency blood transfusion if excessive life threatening bleeding is noted. Discussed risk of injury to surrounding organs including fallopian tubes, ovaries, uterus itself, bladder, bowel, blood vessels, and nerves. Discussed repair intraoperatively of any apparent damage, and discussed possibility of damage that is not apparent until after surgery requiring repeat surgery for repair of damage. We discussed post-operative pain management. Signed informed consent was obtained with    - Obtain type and screen, CBC  - Fen/GI: NPO, IVF. Last ate 11pm.   - PNC:     - Rh positive. Rubella immune. GBS negative.     -Fetal Well Being   - Category I FHT. Reactive and reassuring   - Continue EFM and Shafer    Alix Go MD, MHS  OB/GYN   7/5/2022

## 2022-07-06 LAB
ALT SERPL W P-5'-P-CCNC: 190 U/L (ref 0–50)
AST SERPL W P-5'-P-CCNC: 216 U/L (ref 0–45)
CREAT SERPL-MCNC: 0.58 MG/DL (ref 0.52–1.04)
ERYTHROCYTE [DISTWIDTH] IN BLOOD BY AUTOMATED COUNT: 14.3 % (ref 10–15)
GFR SERPL CREATININE-BSD FRML MDRD: >90 ML/MIN/1.73M2
HCT VFR BLD AUTO: 30.1 % (ref 35–47)
HGB BLD-MCNC: 10.2 G/DL (ref 11.7–15.7)
MCH RBC QN AUTO: 32 PG (ref 26.5–33)
MCHC RBC AUTO-ENTMCNC: 33.9 G/DL (ref 31.5–36.5)
MCV RBC AUTO: 94 FL (ref 78–100)
PATH REPORT.COMMENTS IMP SPEC: NORMAL
PATH REPORT.FINAL DX SPEC: NORMAL
PATH REPORT.FINAL DX SPEC: NORMAL
PATH REPORT.GROSS SPEC: NORMAL
PATH REPORT.GROSS SPEC: NORMAL
PATH REPORT.MICROSCOPIC SPEC OTHER STN: NORMAL
PATH REPORT.MICROSCOPIC SPEC OTHER STN: NORMAL
PATH REPORT.RELEVANT HX SPEC: NORMAL
PATH REPORT.RELEVANT HX SPEC: NORMAL
PHOTO IMAGE: NORMAL
PHOTO IMAGE: NORMAL
PLATELET # BLD AUTO: 46 10E3/UL (ref 150–450)
RBC # BLD AUTO: 3.19 10E6/UL (ref 3.8–5.2)
WBC # BLD AUTO: 11.7 10E3/UL (ref 4–11)

## 2022-07-06 PROCEDURE — 84460 ALANINE AMINO (ALT) (SGPT): CPT | Performed by: OBSTETRICS & GYNECOLOGY

## 2022-07-06 PROCEDURE — 250N000013 HC RX MED GY IP 250 OP 250 PS 637: Performed by: STUDENT IN AN ORGANIZED HEALTH CARE EDUCATION/TRAINING PROGRAM

## 2022-07-06 PROCEDURE — 120N000012 HC R&B POSTPARTUM

## 2022-07-06 PROCEDURE — 82565 ASSAY OF CREATININE: CPT | Performed by: OBSTETRICS & GYNECOLOGY

## 2022-07-06 PROCEDURE — 250N000011 HC RX IP 250 OP 636: Performed by: STUDENT IN AN ORGANIZED HEALTH CARE EDUCATION/TRAINING PROGRAM

## 2022-07-06 PROCEDURE — 250N000013 HC RX MED GY IP 250 OP 250 PS 637: Performed by: OBSTETRICS & GYNECOLOGY

## 2022-07-06 PROCEDURE — 85027 COMPLETE CBC AUTOMATED: CPT | Performed by: OBSTETRICS & GYNECOLOGY

## 2022-07-06 PROCEDURE — 84450 TRANSFERASE (AST) (SGOT): CPT | Performed by: OBSTETRICS & GYNECOLOGY

## 2022-07-06 PROCEDURE — 258N000003 HC RX IP 258 OP 636: Performed by: STUDENT IN AN ORGANIZED HEALTH CARE EDUCATION/TRAINING PROGRAM

## 2022-07-06 PROCEDURE — 36415 COLL VENOUS BLD VENIPUNCTURE: CPT | Performed by: OBSTETRICS & GYNECOLOGY

## 2022-07-06 RX ORDER — ACETAMINOPHEN 325 MG/1
650 TABLET ORAL EVERY 6 HOURS PRN
Status: DISCONTINUED | OUTPATIENT
Start: 2022-07-06 | End: 2022-07-08 | Stop reason: HOSPADM

## 2022-07-06 RX ORDER — FERROUS SULFATE 325(65) MG
325 TABLET ORAL DAILY
Status: DISCONTINUED | OUTPATIENT
Start: 2022-07-06 | End: 2022-07-08 | Stop reason: HOSPADM

## 2022-07-06 RX ADMIN — SENNOSIDES AND DOCUSATE SODIUM 2 TABLET: 50; 8.6 TABLET ORAL at 20:12

## 2022-07-06 RX ADMIN — POLYETHYLENE GLYCOL 3350 17 G: 17 POWDER, FOR SOLUTION ORAL at 09:48

## 2022-07-06 RX ADMIN — ACETAMINOPHEN 650 MG: 325 TABLET ORAL at 20:13

## 2022-07-06 RX ADMIN — SENNOSIDES AND DOCUSATE SODIUM 1 TABLET: 50; 8.6 TABLET ORAL at 09:47

## 2022-07-06 RX ADMIN — SODIUM CHLORIDE, POTASSIUM CHLORIDE, SODIUM LACTATE AND CALCIUM CHLORIDE 75 ML/HR: 600; 310; 30; 20 INJECTION, SOLUTION INTRAVENOUS at 00:29

## 2022-07-06 RX ADMIN — ACETAMINOPHEN 650 MG: 325 TABLET ORAL at 12:15

## 2022-07-06 RX ADMIN — MAGNESIUM SULFATE IN WATER 2 G/HR: 40 INJECTION, SOLUTION INTRAVENOUS at 05:34

## 2022-07-06 RX ADMIN — FERROUS SULFATE TAB 325 MG (65 MG ELEMENTAL FE) 325 MG: 325 (65 FE) TAB at 09:48

## 2022-07-06 ASSESSMENT — ACTIVITIES OF DAILY LIVING (ADL)
ADLS_ACUITY_SCORE: 18

## 2022-07-06 NOTE — PROVIDER NOTIFICATION
Dr. PATRICE Sr updated on patient's morning labs. Order to restart tylenol. She will round on patient around noon.

## 2022-07-06 NOTE — PROVIDER NOTIFICATION
Dr. PATRICE Sr notified of low grade fever. Verified that she did indeed want the tylenol given, and she denied placing the tylenol on hold. Will remove hold.

## 2022-07-06 NOTE — PLAN OF CARE
Vital signs stable. Postpartum assessment WDL. UTV incision, classical, dressing is clean, dry, and intact. Mag infusing per MAR. Pain controlled without medication at this time, PCA discontinued overnight, Oxycodone available, patient declined overnight, educated to call if pain occurs. Patient ambulating independently to the bathroom. Patient denies passing gas. Breastfeeding on cue without assist. Patient and infant bonding well. Jabber in room,  translating overnight, aware that  is available if needed. Will continue with current plan of care.

## 2022-07-06 NOTE — PROVIDER NOTIFICATION
07/06/22 1329   Provider Notification   Provider Name/Title Dr. Sr   Method of Notification Phone   Request Evaluate-Remote   Notification Reason Vital Signs Change         Updated Dr. Sr on pt's temp remaining 100.4 despite tylenol an hour ago.  Rpt temp in 2 hrs and update if remains high.

## 2022-07-06 NOTE — PROVIDER NOTIFICATION
07/05/22 6392   Provider Notification   Provider Name/Title Dr. Tawanna Sr   Method of Notification Phone   Request Evaluate-Remote   Notification Reason Medication Request  (Stop PCA, switch to oral oxy, Mag stop time)     Dr. Tawanna Sr paged regarding stopping hydromorphone PCA as patient's pain is well managed. TORB given to stop hydromorphone PCA, increase oxycodone dose to 5-10 mg every 4 hours. While on the phone, asked for Magnesium order stop time. TORB received for 7:00 AM magnesium stop time.

## 2022-07-06 NOTE — PROGRESS NOTES
[facility]    Obstetrics Post-Op / Progress Note    Assessment & Plan   Assessment:  -1 Day Post-Op  Procedure(s):   SECTION, WITH POSTPARTUM TUBAL LIGATION   Severe pre-eclampsia--HELLP syndrome  Acute blood loss anemia, asymptomatic    Doing well.  Clean wound without signs of infection.  Normal healing wound.  No immediate surgical complications identified.  No excessive bleeding  Pain well-controlled.    Plan:  Ambulation encouraged  Breast feeding strategies discussed  Monitor wound for signs of infection  Isolated temperature of 100.4 Will recheck in 2 hours.  If still elevated plan blood cultures and IV antibiotics  Pain control measures as needed  Reportable signs and symptoms dicussed with the patient  Start iron supplementation  Magnesium stopped this AM.  Plan repeat pre-eclampsia labs in AM  Anticipate discharge in 2 days    Tawanna Sr MD     Interval History   Doing well.  Pain is well-controlled.  No fevers.  No history of wound drainage, warmth or significant erythema.  Good appetite.  Denies chest pain, shortness of breath, nausea or vomiting.  Ambulatory.  Breastfeeding well. Denies any signs/symptoms of severe pre-eclampsia.    Medications     dextrose 5% lactated ringers Stopped (22)     lactated ringers 75 mL/hr (229)     magnesium sulfate Stopped (22)     - MEDICATION INSTRUCTIONS -       - MEDICATION INSTRUCTIONS -       oxytocin in 0.9% NaCl         ferrous sulfate  325 mg Oral Daily     polyethylene glycol  17 g Oral Daily     senna-docusate  1 tablet Oral BID    Or     senna-docusate  2 tablet Oral BID     sodium chloride (PF)  3 mL Intracatheter Q8H       Physical Exam   Temp: 100.4  F (38  C) Temp src: Oral BP: 125/81 Pulse: 100   Resp: 16 SpO2: 100 % O2 Device: None (Room air)    Vitals:    22 0512 22 0408   Weight: 68.5 kg (151 lb) 65.5 kg (144 lb 6.4 oz)     Vital Signs with Ranges  Temp:  [98.6  F (37   C)-100.4  F (38  C)] 100.4  F (38  C)  Pulse:  [] 100  Resp:  [16] 16  BP: (125-137)/(79-93) 125/81  SpO2:  [97 %-100 %] 100 %  I/O last 3 completed shifts:  In: 5867.5 [P.O.:300; I.V.:5317.5]  Out: 2965 [Urine:1475; Emesis/NG output:100; Blood:1390]    Uterine fundus is firm, non-tender and at the level of the umbilicus  Incision C/D/I.  No evidence of infection.  Extremities Non-tender  Heart is regular rate and rhythm and lungs clear to auscultation  Extremities: 2+ DTRS bilaterally    Data   Recent Labs   Lab Test 07/05/22  0538   AS Negative     Recent Labs   Lab Test 07/06/22  0609 07/05/22  1150   HGB 10.2* 10.7*     Recent Labs   Lab Test 01/11/22  1609   RUQIGG Positive*

## 2022-07-06 NOTE — PLAN OF CARE
Vital signs stable. Patient had a low grade, provider notified. Incision intact with steri strips.Voiding without difficulty. Lung sounds clear and equal. Using tylenol for pain management, declines oxycodone. Up ambulating free of dizziness. Voiding well. Labs beginning to improve and will be redrawn tomorrow. Questions/concerns address with the assist of the jabber.

## 2022-07-07 LAB
ALT SERPL W P-5'-P-CCNC: 117 U/L (ref 0–50)
AST SERPL W P-5'-P-CCNC: 75 U/L (ref 0–45)
ERYTHROCYTE [DISTWIDTH] IN BLOOD BY AUTOMATED COUNT: 15 % (ref 10–15)
HCT VFR BLD AUTO: 29.5 % (ref 35–47)
HGB BLD-MCNC: 9.8 G/DL (ref 11.7–15.7)
MCH RBC QN AUTO: 32.2 PG (ref 26.5–33)
MCHC RBC AUTO-ENTMCNC: 33.2 G/DL (ref 31.5–36.5)
MCV RBC AUTO: 97 FL (ref 78–100)
PLATELET # BLD AUTO: 85 10E3/UL (ref 150–450)
RBC # BLD AUTO: 3.04 10E6/UL (ref 3.8–5.2)
WBC # BLD AUTO: 13.3 10E3/UL (ref 4–11)

## 2022-07-07 PROCEDURE — 36415 COLL VENOUS BLD VENIPUNCTURE: CPT | Performed by: OBSTETRICS & GYNECOLOGY

## 2022-07-07 PROCEDURE — 120N000012 HC R&B POSTPARTUM

## 2022-07-07 PROCEDURE — 84450 TRANSFERASE (AST) (SGOT): CPT | Performed by: OBSTETRICS & GYNECOLOGY

## 2022-07-07 PROCEDURE — 84460 ALANINE AMINO (ALT) (SGPT): CPT | Performed by: OBSTETRICS & GYNECOLOGY

## 2022-07-07 PROCEDURE — 250N000013 HC RX MED GY IP 250 OP 250 PS 637: Performed by: OBSTETRICS & GYNECOLOGY

## 2022-07-07 PROCEDURE — 85027 COMPLETE CBC AUTOMATED: CPT | Performed by: OBSTETRICS & GYNECOLOGY

## 2022-07-07 PROCEDURE — 250N000013 HC RX MED GY IP 250 OP 250 PS 637: Performed by: STUDENT IN AN ORGANIZED HEALTH CARE EDUCATION/TRAINING PROGRAM

## 2022-07-07 RX ORDER — OXYCODONE HYDROCHLORIDE 5 MG/1
5 TABLET ORAL EVERY 4 HOURS PRN
Qty: 12 TABLET | Refills: 0 | Status: SHIPPED | OUTPATIENT
Start: 2022-07-07

## 2022-07-07 RX ORDER — ACETAMINOPHEN 325 MG/1
650 TABLET ORAL EVERY 6 HOURS PRN
Qty: 30 TABLET | Refills: 0 | Status: SHIPPED | OUTPATIENT
Start: 2022-07-07

## 2022-07-07 RX ORDER — DOCUSATE SODIUM 100 MG/1
100 CAPSULE, LIQUID FILLED ORAL 2 TIMES DAILY
Qty: 30 CAPSULE | Refills: 1 | Status: SHIPPED | OUTPATIENT
Start: 2022-07-07

## 2022-07-07 RX ADMIN — POLYETHYLENE GLYCOL 3350 17 G: 17 POWDER, FOR SOLUTION ORAL at 08:56

## 2022-07-07 RX ADMIN — SIMETHICONE 80 MG: 80 TABLET, CHEWABLE ORAL at 09:05

## 2022-07-07 RX ADMIN — FERROUS SULFATE TAB 325 MG (65 MG ELEMENTAL FE) 325 MG: 325 (65 FE) TAB at 08:56

## 2022-07-07 RX ADMIN — SENNOSIDES AND DOCUSATE SODIUM 2 TABLET: 50; 8.6 TABLET ORAL at 08:56

## 2022-07-07 RX ADMIN — ACETAMINOPHEN 650 MG: 325 TABLET ORAL at 04:34

## 2022-07-07 ASSESSMENT — ACTIVITIES OF DAILY LIVING (ADL)
ADLS_ACUITY_SCORE: 18

## 2022-07-07 NOTE — PROGRESS NOTES
[facility]    Obstetrics Post-Op / Progress Note    Assessment & Plan   Assessment:  -2 Days Post-Op  Procedure(s):   SECTION, WITH POSTPARTUM TUBAL LIGATION   HELLP syndrome  Acute blood loss anemia, asymptomatic    Doing well.  Pre-eclampsia labs are continuing to improve. Repeat in AM.   Clean wound without signs of infection. Isolated elevated temp x 1. WBC slightly up. No systemic signs of infection. Likely response to hysterotomy through myoma, similar to febrile response seen in myomectomies.   Normal healing wound.  No immediate surgical complications identified.  No excessive bleeding  Pain well-controlled.    Plan:  Ambulation encouraged  Breast feeding strategies discussed  Monitor wound for signs of infection  Pain control measures as needed  Reportable signs and symptoms dicussed with the patient  Anticipate discharge tomorrow    Alix Go MD, S    Interval History   Doing well.  Pain is controlled. Feels a tugging sensation on her abdomen. No fevers.  No of wound drainage, warmth or significant erythema.  Good appetite. No N/V. Passing gas, no BM yet. Epigastric pain from admission is now absent.  Denies chest pain, shortness of breath, HA, vision changes, LE edema. Breastfeeding and bottle feeding. S/p BS for contraception.     Medications     dextrose 5% lactated ringers Stopped (22 0859)     lactated ringers 75 mL/hr (22 0029)     magnesium sulfate Stopped (2259)     - MEDICATION INSTRUCTIONS -       - MEDICATION INSTRUCTIONS -       oxytocin in 0.9% NaCl         ferrous sulfate  325 mg Oral Daily     polyethylene glycol  17 g Oral Daily     senna-docusate  1 tablet Oral BID    Or     senna-docusate  2 tablet Oral BID     sodium chloride (PF)  3 mL Intracatheter Q8H       Physical Exam   Temp: 99  F (37.2  C) Temp src: Oral BP: 129/82 Pulse: 64   Resp: 16   O2 Device: None (Room air)    Vitals:    22 0512 22 0408 22  0142   Weight: 68.5 kg (151 lb) 65.5 kg (144 lb 6.4 oz) 63.2 kg (139 lb 6.4 oz)     Vital Signs with Ranges  Temp:  [98.8  F (37.1  C)-100.4  F (38  C)] 99  F (37.2  C)  Pulse:  [64-90] 64  Resp:  [16] 16  BP: (111-134)/(67-84) 129/82  I/O last 3 completed shifts:  In: 1100 [P.O.:1100]  Out: 1500 [Urine:1500]    Gen: Well appearing in NAD  Uterine fundus is firm, non-tender and at the level of the umbilicus  Incision C/D/I with steri steri strips in place  Abdomen: no epigastric pain to palpation  Heart is regular rate   LE 1+ edema bilaterally     Data   Recent Labs   Lab Test 07/05/22  0538   AS Negative     Recent Labs   Lab Test 07/07/22  0709 07/06/22  0609   HGB 9.8* 10.2*     Recent Labs   Lab Test 01/11/22  1609   RUQIGG Positive*

## 2022-07-07 NOTE — PLAN OF CARE
Vital signs stable with temperatures of .2 F, decreased slightly with Tylenol. Postpartum assessment WDL. Classical incision is approximated, steri strips are open to air, clean, dry, and intact. Pain controlled with Tylenol, Oxycodone available, patient declined overnight and educated to call if increase in pain occurs. Patient ambulating independently. Patient denies passing gas. Breastfeeding on cue without assist, supplementing with 15mL of formula via bottle after each feed for infant high risk/HIR bili. Patient and infant bonding well. Eddieer in room,  translating overnight, aware that  is available if needed. Will continue with current plan of care.

## 2022-07-07 NOTE — LACTATION NOTE
"Lactation visit with Anh, FOB, and baby girl.    Anh had HELLP syndrome and was on Magnesium. They have been supplementing with formula via bottle after breastfeeding. This is Anh's third baby but her other children are teenagers.    With help of , lactation visit consisted of helping with positioning/latch. Anh has large nipples and infant has petite mouth, so we worked on getting infant latched deeply enough for effective latch.     Infant has a high bilirubin and will be starting on phototherapy. So we also talked about continuing to supplement to help infant's bilirubin to come down.       Reviewed  breastfeeding basics:   1) Watch for early feeding cues (licking lips, stirring or rooting, sucking movement with mouth, hands to mouth) and always breast feed on DEMAND.  2) Infant should breastfeed a minimum of 8 times in 24 hours. If it has been 3 hours since last breast feeding session, un-swaddle infant and begin skin to skin to entice infant to nurse.    Reviewed breast feeding section in our \"Guide to Postpartum and  Care.\"     Reviewed breastfeeding positions and techniques to obtain/maintain deep latch (including nose to nipple alignment and supporting infant's shoulder blades vs head when bringing infant in to latch).      Discussed pumping (when it's helpful, when it's necessary, and when to begin pumping for milk storage. Suggesting pumping would be great to bring Anh's milk volume in since infant is sleepy with her feedings and requiring supplementation.    Appreciative of visit.    Juli Yanez RN, IBCLC            "

## 2022-07-07 NOTE — DISCHARGE SUMMARY
DELIVERY DISCHARGE SUMMARY    Admit date: 2022  Discharge date: 2022  Discharge physician: Dr. Tawanna Sr    Admit Dx:   - 41 year old year old  @ 38w0d GA  - HELLP syndrome  - Breech presentation    Discharge Dx:  - Same as above, s/p Primary  section with bilateral salpingectomies    Procedures:  - Primary   - Spinal anesthesia  - LTCS via Pfannenstiel Incision with quadruple layer uterine closure due to fibroid  - Bilateral salpingectomies    Hospital course:  Anh Roberts was admitted as for HELLP syndrome at 38 weeks gestation.  She was a scheduled  due to fetal malpresentation and large uterine fibroid.  was uncomplicated  Please see her admission H&P and Delivery Summary for full details regarding her admission and delivery.    Her postoperative course was complicated by HELLP syndrome. She had 24 hours of Magnesium Sulfate after delivery. On POD#3, she was meeting all of her postpartum goals and deemed stable for discharge. She was voiding without difficulty, tolerating a regular diet without nausea and vomiting, her pain was well controlled on oral pain medicines and her lochia was appropriate.  Her Rh status was positive and RHOgam was not indicated. At the time of discharge, she was breast and bottle feeding her infant and.    # Discharge Pain Plan:   - During her hospitalization, Anh experienced pain due to cesareab.  The pain plan for discharge was discussed with Anh and her spouse and the plan was created in a collaborative fashion.    - Opioids prescribed on discharge: Oxycodone  - Duration of opioids after discharge: Per CDC opioid prescribing guidelines, a 3 day prescription of opioids was provided.  - Bowel regimen: docusate   - Additionally patient will use Tylenol as needed      Discharge/Disposition:  Anh Roberts was discharged to home in stable condition with the following instructions/medications:  1) Call for  temperature > 100.4, foul smelling vaginal discharge, bleeding > 1 pad per hour x 2 hrs, pain not controlled by oral pain meds, severe constipation or severe nausea or vomiting.  2) She will follow-up in 1 week in clinic for blood pressure check  3) She was instructed to follow-up with her primary OB in 6 weeks for a routine postpartum visit  4) She was instructed to continue her PNV on discharge if she wished to breast feed her infant.  5) She was discharged home with the following medications:      Review of your medicines      UNREVIEWED medicines. Ask your doctor about these medicines      Dose / Directions   ferrous sulfate 325 (65 Fe) MG tablet  Commonly known as: FEROSUL  Used for: Intramural leiomyoma of uterus      Dose: 325 mg  Take 1 tablet (325 mg) by mouth 3 times daily (with meals)  Quantity: 90 tablet  Refills: 3     PRENATAL VITAMIN PO      Dose: 1 tablet  Take 1 tablet by mouth daily  Refills: 0            Alix Go MD, S  07/07/22

## 2022-07-07 NOTE — PLAN OF CARE
Vital signs stable, BP remains 120s/80s. Postpartum assessment WDL. Denies preeclampsia symptoms. 2+ reflexes, no clonus. Incision clean, dry and intact with steri strips. Rates pain 3/10 but declines medication, aware she can have medications if needed. Patient is now passing gas and had a bowel movement. Patient ambulating independently. Breastfeeding on cue with minimal assist, then bottle feeding formula after breastfeeding. Patient and infant bonding well. Will continue with current plan of care.

## 2022-07-08 VITALS
BODY MASS INDEX: 26.36 KG/M2 | OXYGEN SATURATION: 100 % | HEIGHT: 61 IN | DIASTOLIC BLOOD PRESSURE: 83 MMHG | HEART RATE: 80 BPM | SYSTOLIC BLOOD PRESSURE: 124 MMHG | RESPIRATION RATE: 16 BRPM | WEIGHT: 139.6 LBS | TEMPERATURE: 98.7 F

## 2022-07-08 LAB
ALT SERPL W P-5'-P-CCNC: 78 U/L (ref 0–50)
AST SERPL W P-5'-P-CCNC: 41 U/L (ref 0–45)
ERYTHROCYTE [DISTWIDTH] IN BLOOD BY AUTOMATED COUNT: 14.6 % (ref 10–15)
HCT VFR BLD AUTO: 27.9 % (ref 35–47)
HGB BLD-MCNC: 9.3 G/DL (ref 11.7–15.7)
MCH RBC QN AUTO: 31.7 PG (ref 26.5–33)
MCHC RBC AUTO-ENTMCNC: 33.3 G/DL (ref 31.5–36.5)
MCV RBC AUTO: 95 FL (ref 78–100)
PLATELET # BLD AUTO: 122 10E3/UL (ref 150–450)
RBC # BLD AUTO: 2.93 10E6/UL (ref 3.8–5.2)
WBC # BLD AUTO: 9.3 10E3/UL (ref 4–11)

## 2022-07-08 PROCEDURE — 36415 COLL VENOUS BLD VENIPUNCTURE: CPT | Performed by: STUDENT IN AN ORGANIZED HEALTH CARE EDUCATION/TRAINING PROGRAM

## 2022-07-08 PROCEDURE — 85027 COMPLETE CBC AUTOMATED: CPT | Performed by: STUDENT IN AN ORGANIZED HEALTH CARE EDUCATION/TRAINING PROGRAM

## 2022-07-08 PROCEDURE — 84450 TRANSFERASE (AST) (SGOT): CPT | Performed by: STUDENT IN AN ORGANIZED HEALTH CARE EDUCATION/TRAINING PROGRAM

## 2022-07-08 PROCEDURE — 250N000013 HC RX MED GY IP 250 OP 250 PS 637: Performed by: OBSTETRICS & GYNECOLOGY

## 2022-07-08 PROCEDURE — 84460 ALANINE AMINO (ALT) (SGPT): CPT | Performed by: STUDENT IN AN ORGANIZED HEALTH CARE EDUCATION/TRAINING PROGRAM

## 2022-07-08 PROCEDURE — 250N000013 HC RX MED GY IP 250 OP 250 PS 637: Performed by: STUDENT IN AN ORGANIZED HEALTH CARE EDUCATION/TRAINING PROGRAM

## 2022-07-08 RX ORDER — BREAST PUMP
1 EACH MISCELLANEOUS CONTINUOUS
Qty: 1 EACH | Refills: 0 | Status: SHIPPED | OUTPATIENT
Start: 2022-07-08

## 2022-07-08 RX ADMIN — ACETAMINOPHEN 650 MG: 325 TABLET ORAL at 10:10

## 2022-07-08 RX ADMIN — FERROUS SULFATE TAB 325 MG (65 MG ELEMENTAL FE) 325 MG: 325 (65 FE) TAB at 10:10

## 2022-07-08 RX ADMIN — POLYETHYLENE GLYCOL 3350 17 G: 17 POWDER, FOR SOLUTION ORAL at 10:10

## 2022-07-08 ASSESSMENT — ACTIVITIES OF DAILY LIVING (ADL)
ADLS_ACUITY_SCORE: 18

## 2022-07-08 NOTE — PROGRESS NOTES
[facility]    Obstetrics Post-Op / Progress Note    Assessment & Plan   Assessment:  -3 Days Post-Op  Procedure(s):   SECTION, WITH POSTPARTUM TUBAL LIGATION   HELLP syndrome  Acute blood loss anemia    Doing well.  Clean wound without signs of infection.  Normal healing wound.  No immediate surgical complications identified.  No excessive bleeding  Pain well-controlled.    Plan:  Ambulation encouraged  Breast feeding strategies discussed  Monitor wound for signs of infection  Pain control measures as needed  Reportable signs and symptoms dicussed with the patient  Discharge later today  Plan to have patient follow-up in clinic next week for blood pressure check    Tawanna Sr MD     Interval History   Doing well.  Pain is well-controlled.  No fevers.  No history of wound drainage, warmth or significant erythema.  Good appetite.  Denies chest pain, shortness of breath, nausea or vomiting.  Ambulatory.  Breastfeeding well.    Medications     dextrose 5% lactated ringers Stopped (22)     lactated ringers 75 mL/hr (229)     magnesium sulfate Stopped (22)     - MEDICATION INSTRUCTIONS -       - MEDICATION INSTRUCTIONS -       oxytocin in 0.9% NaCl         ferrous sulfate  325 mg Oral Daily     polyethylene glycol  17 g Oral Daily     senna-docusate  1 tablet Oral BID    Or     senna-docusate  2 tablet Oral BID     sodium chloride (PF)  3 mL Intracatheter Q8H       Physical Exam   Temp: 98.8  F (37.1  C) Temp src: Oral BP: (!) 137/90 Pulse: 74   Resp: 16   O2 Device: None (Room air)    Vitals:    22 0408 22 0142 22 0600   Weight: 65.5 kg (144 lb 6.4 oz) 63.2 kg (139 lb 6.4 oz) 63.3 kg (139 lb 9.6 oz)     Vital Signs with Ranges  Temp:  [98.8  F (37.1  C)-99  F (37.2  C)] 98.8  F (37.1  C)  Pulse:  [62-87] 74  Resp:  [16] 16  BP: (128-137)/(81-91) 137/90  I/O last 3 completed shifts:  In:  [P.O.:]  Out:   [Urine:2500]    Uterine fundus is firm, non-tender and at the level of the umbilicus  Incision C/D/I  Extremities Non-tender  Heart is regular rate and rhythm and lungs clear to auscultation    Data   Recent Labs   Lab Test 07/05/22  0538   AS Negative     Recent Labs   Lab Test 07/07/22  0709 07/06/22  0609   HGB 9.8* 10.2*     Recent Labs   Lab Test 01/11/22  1609   RUQIGG Positive*

## 2022-07-08 NOTE — DISCHARGE INSTRUCTIONS
Birth Discharge Instructions: Mauritanian  Actividad  No levante más de 10 libras perry las 6 semanas posteriores a samson cirugía. Pida a los miembros de samson ila y amigos que la ayuden cuando lo necesite.  No conduzca si está tomando píldoras para el dolor recetadas por samson médico. Puede conducir si está tomando píldoras de venta deborah para el dolor.  No april ejercicio ni actividades intensas por 6 semanas. No april nada que requiera un esfuerzo en el sitio de samson cirugía.  No april fuerza al usar el baño. Samson equipo de atención podría recetarle un laxante si tiene problemas para  el intestino (estreñimiento).    Para cuidar de samson incisión:  Mantenga la incisión limpia y seca  No empape samson incisión en agua. No nade ni use la leandro ni jacuzzis hasta que samson incisión haya cicatrizado por completo. Puede sentarse en la leandro si el nivel del agua está por debajo de samson incisión.  Después de lavarse, seque alycia samson incisión. Incluya la piel que podría entrar en contacto con samson incisión.  No use agua oxigenada, gel, cremas, lociones ni ungüentos sobre samson incisión.  Ajuste samson ropa para evitar la presión en el sitio de samson cirugía (compruebe el elástico en samson ropa interior, por ejemplo)  Si tiene Steri-Strips, deje las pequeñas tiras de cinta en samson sitio. Se caerán solas o puede quitárselas después de kendall semana.    Podría cole kendall pequeña cantidad de secreción transparente o rosada y esto es normal. Consulte con samson proveedor de atención médica:  Si el drenaje aumenta o tiene olor.  Si tiene hinchazón, enrojecimiento o kendall erupción alrededor de la incisión.  Si tiene más dolor y samson medicamento para el dolor no ayuda  Si tiene fiebre de 100.4  F (38  C) o más (temperatura tomada bajo samson lengua) con o sin escalofríos     El área alrededor de samson incisión (herida de la cirugía) se sentirá adormecida. [place] es normal. El adormecimiento debería desaparecer en menos de un año.       Mantenga alphonse alex limpias:  Siempre lávese las  alex antes de tocar samson incisión. [place] ayuda a reducir samson riesgo de infección. Si alphonse alex no están sucias, puede usar un gel de alcohol para limpiarse las alex. Mantenga alphonse uñas cortas y limpias.   Llame a samson proveedor de atención médica si tiene alguno de estos síntomas:  Empapa kendall toalla femenina con juan alberto en el correr de 1 hora o ve coágulos más grandes que kendall pelota de golf.  Sangrado que dura más de 6 semanas.  Tiene kendall secreción vaginal que huele mal.  Dolor, calambres o sensibilidad graves en la región inferior de samson vientre.  Necesidad más frecuente o urgente de orinar (hacer pis), o ardor al hacerlo.  Náuseas y vómitos  Enrojecimiento, hinchazón o dolor alrededor de kendall vena en samson pierna.  Problemas para amamantar o un área enrojecida o dolorosa en samson pecho.  Si tiene dolor en el pecho y tos o dificultad para respirar.  Problemas para manejar la tristeza, ansiedad o depresión.   Si le preocupa hacerse daño o hacerle daño al bebé, llame al médico de inmediato.  Tiene preguntas o inquietudes después de regresar a casa.       Birth Discharge Instructions  Activity  Do not lift more than 10 pounds for 6 weeks after surgery. Ask family and friends for help when you need it.  Do not drive while taking pain pills prescribed by your doctor. You may drive if taking over-the-counter pain pills.  No heavy exercise or activity for 6 weeks. Don t do anything that will put a strain on your surgery site.  Don t strain when using the toilet. Your care team may prescribe a stool softener if you have problems with your bowel movements (constipation).    To care for your incision:  Keep the incision clean and dry  Do not soak your incision in water. No swimming or hot tubs until your incision has fully healed. You may soak in the bathtub if the water level is below your incision.  After washing, dry your incision well. Include the skin that may come in contact with your incision.  Do not use any peroxide, gel,  cream, lotion, or ointment on your incision.   Adjust your clothes to avoid pressure on your surgery site (check the elastic in your underwear for example)  If you have Steri-Strips, leave the small strips of tape in place. They will fall off on their own, or you can remove them after one week.    You may see a small amount of clear or pink drainage and this is normal. Check with your health care provider:  If the drainage increases or has an odor.  If you have swelling, redness, or a rash around the incision.  If you have increased pain and your pain medicine doesn t help  If you have a fever of 100.4  F (38  C) or higher (temperature taken under your tongue), with or without chills   The area around your incision (surgery wound) will feel numb. This is normal. The numbness should go away in less than a year.   Keep your hands clean:   Always wash your hands before touching your incision. This helps reduce your risk of infection. If your hands aren t dirty, you may use an alcohol hand-rub to clean your hands. Keep your nails clean and short.     Call your health care provider if you have any of these symptoms:  You soak a sanitary pad with blood within 1 hour, or you see blood clots larger than a golf ball.  Bleeding that lasts more than 6 weeks.  You have vaginal discharge that smells bad.  Severe pain, cramping or tenderness in your lower belly area.  A more frequent or urgent need to urinate (pee), or it burns when you pee.  Nausea and vomiting.  Redness, swelling or pain around a vein in your leg.  Problems breastfeeding, or a red or painful area on your breast.  If you have chest pain and cough or are gasping for air.  Problems coping with sadness, anxiety, or depression.     Llame a samson médico de inmediato si tiene alguno de los siguientes:  Hinchazón en el leann o alex  Aumento de peso rápido: aproximadamente 1 jason o más por día  Mamie de wang  Dolor en el lado derecho del abdomen (vientre)  Problemas  con la vista (ve destellos o manchas)  Tiene preguntas o inquietudes al regresar a casa    Call your doctor right away if you have any of the following:  Swelling in your face or hands  Rapid weight gain--about 1 pound or more in a day  Headaches  Pain on the right side of abdomen (belly)  Problems with your eyesight (you see flashes or spots)  You have questions or concerns once you return home.    If you have any concerns about hurting yourself of the baby, call your doctor right away.    You have questions or concerns after you return home.

## 2022-07-08 NOTE — PLAN OF CARE
D: VSS, assessments WDL. Reflexes are 2+, no clonus. Pain controlled with tylenol given as needed, declines need for other pain medications.   I: Pt. received complete discharge paperwork and home medications as filled by discharge pharmacy, sent to patient's own outside pharmacy.  Pt. was given times of last dose for all discharge medications in writing on discharge medication sheets.  Discharge teaching included home medication, pain management, activity restrictions, postpartum cares, and signs and symptoms of infection, reviewed with  and instructions given written in Croatian.  A: Discharge outcomes on care plan met.  Mother states understanding and comfort with self care and follow up care.   P: Pt. Will be Discharged to home. Pt. to follow up with OB provider per discharge instructions.  Pt. had no further questions at the time of discharge and no unmet needs were identified.

## 2022-07-08 NOTE — PLAN OF CARE
BP mildly elevated, denies headache, blurry vision, or epigastric pain.  Incision C/D/I.  Up to bathroom independently, voiding adequately.  Pain well controlled, denies pain medications.   Working on breastfeeding  and  cares, Pumping EBM. AST and ALT scheduled for AM, continue to monitor and notify provider as needed.

## 2022-07-14 NOTE — PROGRESS NOTES
"    SUBJECTIVE:                                                   Anh Roberts is a 41 year old female who presents to clinic today for the following health issue(s):  No chief complaint on file.      Additional information: ***    HPI:  ***    Patient's last menstrual period was 10/12/2021 (exact date)..     Patient {is/is not:500173::\"is\"} sexually active, .  Using {PLC CONTRACEPTION:650883} for contraception.    reports that she has never smoked. She has never used smokeless tobacco.  {Tobacco Cessation -- Delete if patient is a non-smoker:400099}  STD testing offered?  {PLC GC/CHLAMYDIA:225746}    Health maintenance updated:  {yes no:492144}    Today's PHQ-2 Score: No flowsheet data found.  Today's PHQ-9 Score: No flowsheet data found.  Today's BOB-7 Score: No flowsheet data found.    Problem list and histories reviewed & adjusted, as indicated.  Additional history: as documented.    Patient Active Problem List   Diagnosis     AMA (advanced maternal age) multigravida 35+, second trimester     Supervision of high risk pregnancy in second trimester     Leiomyoma of uterus affecting pregnancy in second trimester     Preeclampsia, severe, third trimester     S/P primary low transverse      Intramural leiomyoma of uterus     Past Surgical History:   Procedure Laterality Date      SECTION, TUBAL LIGATION, COMBINED N/A 2022    Procedure:  SECTION, WITH POSTPARTUM TUBAL LIGATION;  Surgeon: Alix Go MD;  Location:  L+D     NO PAST SURGERIES        Social History     Tobacco Use     Smoking status: Never Smoker     Smokeless tobacco: Never Used   Substance Use Topics     Alcohol use: Never      Problem (# of Occurrences) Relation (Name,Age of Onset)    Diabetes (1) Mother    No Known Problems (4) Maternal Grandmother, Maternal Grandfather, Son, Daughter    Unknown/Adopted (1) Father            Current Outpatient Medications   Medication Sig     acetaminophen (TYLENOL) " "325 MG tablet Take 2 tablets (650 mg) by mouth every 6 hours as needed for mild pain or fever     docusate sodium (COLACE) 100 MG capsule Take 1 capsule (100 mg) by mouth 2 times daily     ferrous sulfate (FEROSUL) 325 (65 Fe) MG tablet Take 1 tablet (325 mg) by mouth 3 times daily (with meals)     Misc. Devices (BREAST PUMP) MISC 1 each continuous     oxyCODONE (ROXICODONE) 5 MG tablet Take 1 tablet (5 mg) by mouth every 4 hours as needed for moderate to severe pain     Prenatal Vit-Fe Fumarate-FA (PRENATAL VITAMIN PO) Take 1 tablet by mouth daily     No current facility-administered medications for this visit.     Allergies   Allergen Reactions     Ibuprofen Swelling     [place]acia[place] Swelling     Facial swelling and rash     Aspirin      Swelling of extremities,        ROS:  {[facility] ROSGYN:581824::\"12 point review of systems negative other than symptoms noted below or in the HPI.\"}  {ROS - :762893::\"No urinary frequency or dysuria, bladder or kidney problems\"}      OBJECTIVE:     LMP 10/12/2021 (Exact Date)   There is no height or weight on file to calculate BMI.    Exam:  {[facility] EXAM CHOICES:876885}     In-Clinic Test Results:  No results found for this or any previous visit (from the past 24 hour(s)).    ASSESSMENT/PLAN:                                                      No diagnosis found.    There are no Patient Instructions on file for this visit.    ***    Tawanna Sr MD  [facility] FOR [facility]  "

## 2022-07-19 ENCOUNTER — OFFICE VISIT (OUTPATIENT)
Dept: OBGYN | Facility: CLINIC | Age: 42
End: 2022-07-19
Payer: COMMERCIAL

## 2022-07-19 VITALS — BODY MASS INDEX: 24.19 KG/M2 | SYSTOLIC BLOOD PRESSURE: 110 MMHG | DIASTOLIC BLOOD PRESSURE: 66 MMHG | WEIGHT: 128 LBS

## 2022-07-19 PROCEDURE — 99024 POSTOP FOLLOW-UP VISIT: CPT | Performed by: OBSTETRICS & GYNECOLOGY

## 2022-07-19 ASSESSMENT — ANXIETY QUESTIONNAIRES
7. FEELING AFRAID AS IF SOMETHING AWFUL MIGHT HAPPEN: NOT AT ALL
GAD7 TOTAL SCORE: 0
IF YOU CHECKED OFF ANY PROBLEMS ON THIS QUESTIONNAIRE, HOW DIFFICULT HAVE THESE PROBLEMS MADE IT FOR YOU TO DO YOUR WORK, TAKE CARE OF THINGS AT HOME, OR GET ALONG WITH OTHER PEOPLE: NOT DIFFICULT AT ALL
5. BEING SO RESTLESS THAT IT IS HARD TO SIT STILL: NOT AT ALL
1. FEELING NERVOUS, ANXIOUS, OR ON EDGE: NOT AT ALL
GAD7 TOTAL SCORE: 0
6. BECOMING EASILY ANNOYED OR IRRITABLE: NOT AT ALL
3. WORRYING TOO MUCH ABOUT DIFFERENT THINGS: NOT AT ALL
2. NOT BEING ABLE TO STOP OR CONTROL WORRYING: NOT AT ALL

## 2022-07-19 ASSESSMENT — PATIENT HEALTH QUESTIONNAIRE - PHQ9
5. POOR APPETITE OR OVEREATING: NOT AT ALL
SUM OF ALL RESPONSES TO PHQ QUESTIONS 1-9: 0

## 2022-07-19 NOTE — PROGRESS NOTES
SUBJECTIVE:                                                   Anh Roberts is a 41 year old female who presents to clinic today for the following health issue(s):  Patient presents with:  Post Partum Exam: 2wk      Additional information: 2wk     HPI:  Patient is doing well.  She has no concerns today.  She feels her pain is well managed.  She is not currently using any medications for pain.  She still has some minimal lochia.  She is breastfeeding.  So far this has gone well.    Patient's last menstrual period was 10/12/2021 (exact date)..     Patient is not sexually active, .  Using none for contraception.    reports that she has never smoked. She has never used smokeless tobacco.    STD testing offered?  Declined    Health maintenance updated:  yes    Today's PHQ-2 Score:   PHQ-2 (  [employer]) 2022   Q1: Little interest or pleasure in doing things 0   Q2: Feeling down, depressed or hopeless 0   PHQ-2 Score 0     Today's PHQ-9 Score:   PHQ-9 SCORE 2022   PHQ-9 Total Score 0     Today's BOB-7 Score:   BOB-7 SCORE 2022   Total Score 0       Problem list and histories reviewed & adjusted, as indicated.  Additional history: as documented.    Patient Active Problem List   Diagnosis     AMA (advanced maternal age) multigravida 35+, second trimester     Supervision of high risk pregnancy in second trimester     Leiomyoma of uterus affecting pregnancy in second trimester     Preeclampsia, severe, third trimester     S/P primary low transverse      Intramural leiomyoma of uterus     Past Surgical History:   Procedure Laterality Date      SECTION, TUBAL LIGATION, COMBINED N/A 2022    Procedure:  SECTION, WITH POSTPARTUM TUBAL LIGATION;  Surgeon: Alix Go MD;  Location:  L+D     NO PAST SURGERIES        Social History     Tobacco Use     Smoking status: Never Smoker     Smokeless tobacco: Never Used   Substance Use Topics     Alcohol use: Never       Problem (# of Occurrences) Relation (Name,Age of Onset)    Diabetes (1) Mother    No Known Problems (4) Maternal Grandmother, Maternal Grandfather, Son, Daughter    Unknown/Adopted (1) Father            Current Outpatient Medications   Medication Sig     acetaminophen (TYLENOL) 325 MG tablet Take 2 tablets (650 mg) by mouth every 6 hours as needed for mild pain or fever     docusate sodium (COLACE) 100 MG capsule Take 1 capsule (100 mg) by mouth 2 times daily     ferrous sulfate (FEROSUL) 325 (65 Fe) MG tablet Take 1 tablet (325 mg) by mouth 3 times daily (with meals)     Misc. Devices (BREAST PUMP) MISC 1 each continuous     oxyCODONE (ROXICODONE) 5 MG tablet Take 1 tablet (5 mg) by mouth every 4 hours as needed for moderate to severe pain     Prenatal Vit-Fe Fumarate-FA (PRENATAL VITAMIN PO) Take 1 tablet by mouth daily     No current facility-administered medications for this visit.     Allergies   Allergen Reactions     Ibuprofen Swelling     [place] Swelling     Facial swelling and rash     Aspirin      Swelling of extremities,        ROS:  12 point review of systems negative other than symptoms noted below or in the HPI.  No urinary frequency or dysuria, bladder or kidney problems      OBJECTIVE:     /66   Wt 58.1 kg (128 lb)   LMP 10/12/2021 (Exact Date)   Breastfeeding Yes   BMI 24.19 kg/m    Body mass index is 24.19 kg/m .    Exam:  Constitutional:  Appearance: Well nourished, well developed alert, in no acute distress  Gastrointestinal:  Abdominal Examination:  Abdomen nontender to palpation, tone normal without rigidity or guarding, no masses present, umbilicus without lesions; Liver/Spleen:  No hepatomegaly present, liver nontender to palpation; Hernias:  No hernias present.  Incision is healing well.  No evidence of infection.  Psychiatric:  Mentation appears normal and affect normal/bright.     In-Clinic Test Results:  No results found for this or any previous visit (from the past 24  hour(s)).    ASSESSMENT/PLAN:                                                        ICD-10-CM    1. Encounter for routine postpartum follow-up  Z39.2        There are no Patient Instructions on file for this visit.    Continue postpartum recovery.  Normal blood pressures today.  Plan 6 week postpartum visit.    Tawanna Sr MD  [facility] FOR [facility]

## 2022-08-25 ENCOUNTER — PRENATAL OFFICE VISIT (OUTPATIENT)
Dept: OBGYN | Facility: CLINIC | Age: 42
End: 2022-08-25
Payer: COMMERCIAL

## 2022-08-25 VITALS
SYSTOLIC BLOOD PRESSURE: 116 MMHG | WEIGHT: 127 LBS | HEIGHT: 61 IN | BODY MASS INDEX: 23.98 KG/M2 | DIASTOLIC BLOOD PRESSURE: 60 MMHG

## 2022-08-25 DIAGNOSIS — Z12.4 CERVICAL CANCER SCREENING: ICD-10-CM

## 2022-08-25 PROCEDURE — 99207 PR POST PARTUM EXAM: CPT | Performed by: OBSTETRICS & GYNECOLOGY

## 2022-08-25 PROCEDURE — 87624 HPV HI-RISK TYP POOLED RSLT: CPT | Performed by: OBSTETRICS & GYNECOLOGY

## 2022-08-25 PROCEDURE — G0145 SCR C/V CYTO,THINLAYER,RESCR: HCPCS | Performed by: OBSTETRICS & GYNECOLOGY

## 2022-08-25 ASSESSMENT — ANXIETY QUESTIONNAIRES
6. BECOMING EASILY ANNOYED OR IRRITABLE: NOT AT ALL
GAD7 TOTAL SCORE: 0
5. BEING SO RESTLESS THAT IT IS HARD TO SIT STILL: NOT AT ALL
2. NOT BEING ABLE TO STOP OR CONTROL WORRYING: NOT AT ALL
3. WORRYING TOO MUCH ABOUT DIFFERENT THINGS: NOT AT ALL
7. FEELING AFRAID AS IF SOMETHING AWFUL MIGHT HAPPEN: NOT AT ALL
1. FEELING NERVOUS, ANXIOUS, OR ON EDGE: NOT AT ALL
GAD7 TOTAL SCORE: 0

## 2022-08-25 ASSESSMENT — PATIENT HEALTH QUESTIONNAIRE - PHQ9
SUM OF ALL RESPONSES TO PHQ QUESTIONS 1-9: 0
5. POOR APPETITE OR OVEREATING: NOT AT ALL

## 2022-08-25 NOTE — PROGRESS NOTES
"SUBJECTIVE:  Anh Roberts,  is here for a postpartum visit.  She had a  Section  on 2022 delivering a healthy baby girl, named Deanne Evangelista weighing 6 lbs 14.1 oz at term.      HPI:  Patient is feeling good today.  She has no concerns.  Last PHQ-9 score on record=   PHQ-9 SCORE 2022   PHQ-9 Total Score 0     BOB-7 SCORE 2022   Total Score 0 0       Pap: (No results found for: GYNINTERP, PAP )  Due     Delivery complications:  No  Breast feeding:  Yes, no issues or concerns  Bladder problems:  No  Bowel problems/hemorrhoids:  No  Episiotomy/laceration/incision healed? No  Vaginal flow:  None  [place]:  No  Contraception: Bilateral slapingectomy  Emotional adjustment:  doing well and happy  Back to work:  No time soon     12 point review of systems negative other than symptoms noted below or in the HPI.    OBJECTIVE:  Vitals: /60   Ht 1.549 m (5' 1\")   Wt 57.6 kg (127 lb)   LMP 10/12/2021 (Exact Date)   BMI 24.00 kg/m    BMI= Body mass index is 24 kg/m .  General - pleasant female in no acute distress.  Breast -  symmetric and no skin changes  Abdomen - Incision well-healed  Pelvic - EG: normal adult female, BUS: within normal limits, Vagina: well rugated, no discharge, Cervix: no lesions or CMT, Uterus: firm, normal sized and nontender, midplane in position. Adnexae: no masses or tenderness.  Pap collected  Rectovaginal - deferred.    ASSESSMENT:    ICD-10-CM    1. Encounter for routine postpartum follow-up  Z39.2    2. Cervical cancer screening  Z12.4 Pap thin layer screen with HPV - recommended age 30 - 65 years       PLAN:  May resume normal activities without restrictions.  Pap smear was done today.    Full counseling was provided, and all questions were answered.   Return to clinic in one year for an annual visit.     There are no Patient Instructions on file for this visit.  Tawanna Sr MD      "

## 2022-08-29 LAB
BKR LAB AP GYN ADEQUACY: NORMAL
BKR LAB AP GYN INTERPRETATION: NORMAL
BKR LAB AP HPV REFLEX: NORMAL
BKR LAB AP PREVIOUS ABNORMAL: NORMAL
PATH REPORT.COMMENTS IMP SPEC: NORMAL
PATH REPORT.COMMENTS IMP SPEC: NORMAL
PATH REPORT.RELEVANT HX SPEC: NORMAL

## 2022-09-01 LAB
HUMAN PAPILLOMA VIRUS 16 DNA: NEGATIVE
HUMAN PAPILLOMA VIRUS 18 DNA: NEGATIVE
HUMAN PAPILLOMA VIRUS FINAL DIAGNOSIS: NORMAL
HUMAN PAPILLOMA VIRUS OTHER HR: NEGATIVE

## 2024-02-12 ENCOUNTER — PATIENT OUTREACH (OUTPATIENT)
Dept: CARE COORDINATION | Facility: CLINIC | Age: 44
End: 2024-02-12
Payer: COMMERCIAL

## (undated) DEVICE — SOL NACL 0.9% IRRIG 1000ML BOTTLE 07138-09

## (undated) DEVICE — DRAPE SHEET REV FOLD 3/4 9349

## (undated) DEVICE — DRSG KERLIX FLUFFS X5

## (undated) DEVICE — DRSG STERI STRIP 1/4X3" R1541

## (undated) DEVICE — SOL WATER IRRIG 1000ML BOTTLE 07139-09

## (undated) DEVICE — Device

## (undated) DEVICE — SU MONOCRYL 0 CTX 36" Y398H

## (undated) DEVICE — PREP CHLORAPREP 26ML TINTED ORANGE  260815

## (undated) DEVICE — BARRIER INTERCEED 3X4" 4350

## (undated) DEVICE — SU CHROMIC 2-0 SH 27" G123H

## (undated) DEVICE — DRSG TELFA ISLAND 4X10"

## (undated) DEVICE — LINEN BABY BLANKET 5434

## (undated) DEVICE — PAD CHUX UNDERPAD 23X24" 7136

## (undated) DEVICE — PACK C-SECTION LF PL15OTA83B

## (undated) DEVICE — ESU GROUND PAD UNIVERSAL W/O CORD

## (undated) DEVICE — PAD ABD 7.5INW X 8INL NON-WOVEN FLUFF-FILLED 9192A

## (undated) DEVICE — PACK SET-UP STD 9102

## (undated) DEVICE — SUCTION CANISTER MEDIVAC LINER 3000ML W/LID 65651-530

## (undated) DEVICE — LINEN TOWEL PACK X5 5464

## (undated) DEVICE — SU VICRYL 0 CT 36" J358H

## (undated) DEVICE — LIGHT HANDLE X2

## (undated) DEVICE — BLADE CLIPPER 4406

## (undated) DEVICE — STPL SKIN PROXIMATE 35 WIDE PMW35

## (undated) DEVICE — SU PDS II 0 CTX 60" Z990G

## (undated) RX ORDER — CHLOROPROCAINE HYDROCHLORIDE 30 MG/ML
INJECTION, SOLUTION EPIDURAL; INFILTRATION; INTRACAUDAL; PERINEURAL
Status: DISPENSED
Start: 2022-07-05

## (undated) RX ORDER — MORPHINE SULFATE 1 MG/ML
INJECTION, SOLUTION EPIDURAL; INTRATHECAL; INTRAVENOUS
Status: DISPENSED
Start: 2022-07-05

## (undated) RX ORDER — ALBUMIN, HUMAN INJ 5% 5 %
SOLUTION INTRAVENOUS
Status: DISPENSED
Start: 2022-07-05

## (undated) RX ORDER — ONDANSETRON 2 MG/ML
INJECTION INTRAMUSCULAR; INTRAVENOUS
Status: DISPENSED
Start: 2022-07-05

## (undated) RX ORDER — TRANEXAMIC ACID 10 MG/ML
INJECTION, SOLUTION INTRAVENOUS
Status: DISPENSED
Start: 2022-07-05

## (undated) RX ORDER — OXYTOCIN/0.9 % SODIUM CHLORIDE 30/500 ML
PLASTIC BAG, INJECTION (ML) INTRAVENOUS
Status: DISPENSED
Start: 2022-07-05

## (undated) RX ORDER — PROPOFOL 10 MG/ML
INJECTION, EMULSION INTRAVENOUS
Status: DISPENSED
Start: 2022-07-05

## (undated) RX ORDER — FENTANYL CITRATE 50 UG/ML
INJECTION, SOLUTION INTRAMUSCULAR; INTRAVENOUS
Status: DISPENSED
Start: 2022-07-05

## (undated) RX ORDER — LIDOCAINE HYDROCHLORIDE 20 MG/ML
INJECTION, SOLUTION EPIDURAL; INFILTRATION; INTRACAUDAL; PERINEURAL
Status: DISPENSED
Start: 2022-07-05